# Patient Record
Sex: MALE | Race: WHITE | NOT HISPANIC OR LATINO
[De-identification: names, ages, dates, MRNs, and addresses within clinical notes are randomized per-mention and may not be internally consistent; named-entity substitution may affect disease eponyms.]

---

## 2017-01-25 ENCOUNTER — APPOINTMENT (OUTPATIENT)
Dept: ELECTROPHYSIOLOGY | Facility: CLINIC | Age: 82
End: 2017-01-25

## 2017-02-10 ENCOUNTER — OUTPATIENT (OUTPATIENT)
Dept: OUTPATIENT SERVICES | Facility: HOSPITAL | Age: 82
LOS: 1 days | Discharge: HOME | End: 2017-02-10

## 2017-02-15 ENCOUNTER — APPOINTMENT (OUTPATIENT)
Dept: RADIATION ONCOLOGY | Facility: CLINIC | Age: 82
End: 2017-02-15
Payer: MEDICARE

## 2017-02-15 VITALS — BODY MASS INDEX: 26.44 KG/M2 | WEIGHT: 189.6 LBS

## 2017-02-15 PROCEDURE — 99204 OFFICE O/P NEW MOD 45 MIN: CPT

## 2017-05-04 ENCOUNTER — APPOINTMENT (OUTPATIENT)
Dept: ELECTROPHYSIOLOGY | Facility: CLINIC | Age: 82
End: 2017-05-04

## 2017-06-14 PROCEDURE — 77263 THER RADIOLOGY TX PLNG CPLX: CPT

## 2017-06-15 ENCOUNTER — APPOINTMENT (OUTPATIENT)
Dept: RADIATION ONCOLOGY | Facility: CLINIC | Age: 82
End: 2017-06-15

## 2017-06-22 PROCEDURE — 77334 RADIATION TREATMENT AID(S): CPT | Mod: 26

## 2017-06-22 PROCEDURE — 77321 SPECIAL TELETX PORT PLAN: CPT | Mod: 26

## 2017-06-22 PROCEDURE — 77307 TELETHX ISODOSE PLAN CPLX: CPT | Mod: 26,59

## 2017-07-19 PROCEDURE — 77280 THER RAD SIMULAJ FIELD SMPL: CPT | Mod: 26

## 2017-07-20 PROCEDURE — 77427 RADIATION TX MANAGEMENT X5: CPT

## 2017-07-24 VITALS
HEART RATE: 71 BPM | RESPIRATION RATE: 16 BRPM | OXYGEN SATURATION: 98 % | DIASTOLIC BLOOD PRESSURE: 58 MMHG | SYSTOLIC BLOOD PRESSURE: 90 MMHG

## 2017-07-27 PROCEDURE — 77427 RADIATION TX MANAGEMENT X5: CPT

## 2017-07-31 VITALS
HEIGHT: 71 IN | WEIGHT: 214.29 LBS | OXYGEN SATURATION: 97 % | TEMPERATURE: 96.8 F | RESPIRATION RATE: 16 BRPM | HEART RATE: 78 BPM | SYSTOLIC BLOOD PRESSURE: 164 MMHG | DIASTOLIC BLOOD PRESSURE: 92 MMHG | BODY MASS INDEX: 30 KG/M2

## 2017-08-03 PROCEDURE — 77427 RADIATION TX MANAGEMENT X5: CPT

## 2017-08-09 ENCOUNTER — APPOINTMENT (OUTPATIENT)
Dept: ELECTROPHYSIOLOGY | Facility: CLINIC | Age: 82
End: 2017-08-09
Payer: MEDICARE

## 2017-08-10 PROCEDURE — 77427 RADIATION TX MANAGEMENT X5: CPT

## 2017-08-14 VITALS
OXYGEN SATURATION: 98 % | BODY MASS INDEX: 29.96 KG/M2 | DIASTOLIC BLOOD PRESSURE: 91 MMHG | RESPIRATION RATE: 16 BRPM | WEIGHT: 214 LBS | HEART RATE: 66 BPM | HEIGHT: 71 IN | SYSTOLIC BLOOD PRESSURE: 160 MMHG

## 2017-09-20 ENCOUNTER — APPOINTMENT (OUTPATIENT)
Dept: ELECTROPHYSIOLOGY | Facility: CLINIC | Age: 82
End: 2017-09-20
Payer: MEDICARE

## 2017-09-20 VITALS — DIASTOLIC BLOOD PRESSURE: 79 MMHG | SYSTOLIC BLOOD PRESSURE: 138 MMHG | HEART RATE: 60 BPM

## 2017-09-20 PROCEDURE — 93281 PM DEVICE PROGR EVAL MULTI: CPT

## 2017-09-20 RX ORDER — OFLOXACIN 3 MG/ML
0.3 SOLUTION/ DROPS OPHTHALMIC
Qty: 5 | Refills: 0 | Status: COMPLETED | COMMUNITY
Start: 2016-10-18

## 2017-09-20 RX ORDER — MUPIROCIN 20 MG/G
2 OINTMENT TOPICAL
Qty: 22 | Refills: 0 | Status: COMPLETED | COMMUNITY
Start: 2017-02-06

## 2017-10-09 ENCOUNTER — APPOINTMENT (OUTPATIENT)
Dept: RADIATION ONCOLOGY | Facility: CLINIC | Age: 82
End: 2017-10-09
Payer: MEDICARE

## 2017-10-09 VITALS
DIASTOLIC BLOOD PRESSURE: 99 MMHG | HEART RATE: 80 BPM | HEIGHT: 71 IN | SYSTOLIC BLOOD PRESSURE: 152 MMHG | OXYGEN SATURATION: 94 %

## 2017-10-09 DIAGNOSIS — C49.0 MALIGNANT NEOPLASM OF CONNECTIVE AND SOFT TISSUE OF HEAD, FACE AND NECK: ICD-10-CM

## 2017-10-09 PROCEDURE — 99024 POSTOP FOLLOW-UP VISIT: CPT

## 2017-11-28 ENCOUNTER — RX RENEWAL (OUTPATIENT)
Age: 82
End: 2017-11-28

## 2017-12-14 ENCOUNTER — APPOINTMENT (OUTPATIENT)
Dept: NEUROLOGY | Facility: CLINIC | Age: 82
End: 2017-12-14

## 2017-12-21 ENCOUNTER — OUTPATIENT (OUTPATIENT)
Dept: OUTPATIENT SERVICES | Facility: HOSPITAL | Age: 82
LOS: 1 days | End: 2017-12-21
Payer: MEDICARE

## 2017-12-21 ENCOUNTER — APPOINTMENT (OUTPATIENT)
Dept: CT IMAGING | Facility: IMAGING CENTER | Age: 82
End: 2017-12-21
Payer: MEDICARE

## 2017-12-21 DIAGNOSIS — Z00.8 ENCOUNTER FOR OTHER GENERAL EXAMINATION: ICD-10-CM

## 2017-12-21 PROCEDURE — 70450 CT HEAD/BRAIN W/O DYE: CPT | Mod: 26

## 2017-12-21 PROCEDURE — 70450 CT HEAD/BRAIN W/O DYE: CPT

## 2018-01-09 ENCOUNTER — APPOINTMENT (OUTPATIENT)
Dept: ELECTROPHYSIOLOGY | Facility: CLINIC | Age: 83
End: 2018-01-09

## 2018-04-18 ENCOUNTER — APPOINTMENT (OUTPATIENT)
Dept: ELECTROPHYSIOLOGY | Facility: CLINIC | Age: 83
End: 2018-04-18
Payer: MEDICARE

## 2018-04-18 PROCEDURE — 93281 PM DEVICE PROGR EVAL MULTI: CPT

## 2018-07-23 ENCOUNTER — APPOINTMENT (OUTPATIENT)
Dept: ELECTROPHYSIOLOGY | Facility: CLINIC | Age: 83
End: 2018-07-23

## 2018-10-24 ENCOUNTER — APPOINTMENT (OUTPATIENT)
Dept: ELECTROPHYSIOLOGY | Facility: CLINIC | Age: 83
End: 2018-10-24

## 2018-10-26 ENCOUNTER — APPOINTMENT (OUTPATIENT)
Dept: ELECTROPHYSIOLOGY | Facility: CLINIC | Age: 83
End: 2018-10-26
Payer: MEDICARE

## 2018-10-26 DIAGNOSIS — I50.22 CHRONIC SYSTOLIC (CONGESTIVE) HEART FAILURE: ICD-10-CM

## 2018-10-26 PROCEDURE — 93295 DEV INTERROG REMOTE 1/2/MLT: CPT

## 2018-10-26 PROCEDURE — 93296 REM INTERROG EVL PM/IDS: CPT

## 2019-02-04 ENCOUNTER — APPOINTMENT (OUTPATIENT)
Dept: ELECTROPHYSIOLOGY | Facility: CLINIC | Age: 84
End: 2019-02-04

## 2019-06-05 ENCOUNTER — APPOINTMENT (OUTPATIENT)
Dept: ELECTROPHYSIOLOGY | Facility: CLINIC | Age: 84
End: 2019-06-05
Payer: MEDICARE

## 2019-06-05 DIAGNOSIS — I44.2 ATRIOVENTRICULAR BLOCK, COMPLETE: ICD-10-CM

## 2019-06-05 PROCEDURE — 93281 PM DEVICE PROGR EVAL MULTI: CPT

## 2019-09-09 ENCOUNTER — APPOINTMENT (OUTPATIENT)
Dept: ELECTROPHYSIOLOGY | Facility: CLINIC | Age: 84
End: 2019-09-09

## 2019-09-23 ENCOUNTER — APPOINTMENT (OUTPATIENT)
Dept: ELECTROPHYSIOLOGY | Facility: CLINIC | Age: 84
End: 2019-09-23
Payer: MEDICARE

## 2019-09-23 PROCEDURE — 93296 REM INTERROG EVL PM/IDS: CPT

## 2019-09-23 PROCEDURE — 93294 REM INTERROG EVL PM/LDLS PM: CPT

## 2019-12-05 ENCOUNTER — APPOINTMENT (OUTPATIENT)
Dept: ELECTROPHYSIOLOGY | Facility: CLINIC | Age: 84
End: 2019-12-05

## 2020-01-13 ENCOUNTER — INPATIENT (INPATIENT)
Facility: HOSPITAL | Age: 85
LOS: 3 days | Discharge: NURSING, REHAB OR LONG-TERM CARE | DRG: 563 | End: 2020-01-17
Attending: ORTHOPAEDIC SURGERY | Admitting: ORTHOPAEDIC SURGERY
Payer: MEDICARE

## 2020-01-13 VITALS
DIASTOLIC BLOOD PRESSURE: 66 MMHG | RESPIRATION RATE: 16 BRPM | HEIGHT: 72 IN | WEIGHT: 220.02 LBS | HEART RATE: 80 BPM | SYSTOLIC BLOOD PRESSURE: 112 MMHG | OXYGEN SATURATION: 99 %

## 2020-01-13 DIAGNOSIS — S82.201A UNSPECIFIED FRACTURE OF SHAFT OF RIGHT TIBIA, INITIAL ENCOUNTER FOR CLOSED FRACTURE: ICD-10-CM

## 2020-01-13 DIAGNOSIS — I10 ESSENTIAL (PRIMARY) HYPERTENSION: ICD-10-CM

## 2020-01-13 DIAGNOSIS — D35.2 BENIGN NEOPLASM OF PITUITARY GLAND: ICD-10-CM

## 2020-01-13 LAB
ALBUMIN SERPL ELPH-MCNC: 3.2 G/DL — LOW (ref 3.3–5)
ALP SERPL-CCNC: 129 U/L — HIGH (ref 40–120)
ALT FLD-CCNC: 23 U/L — SIGNIFICANT CHANGE UP (ref 10–45)
ANION GAP SERPL CALC-SCNC: 9 MMOL/L — SIGNIFICANT CHANGE UP (ref 5–17)
APPEARANCE UR: ABNORMAL
APTT BLD: 26.5 SEC — LOW (ref 27.5–36.3)
AST SERPL-CCNC: 36 U/L — SIGNIFICANT CHANGE UP (ref 10–40)
BACTERIA # UR AUTO: ABNORMAL
BASOPHILS # BLD AUTO: 0.04 K/UL — SIGNIFICANT CHANGE UP (ref 0–0.2)
BASOPHILS NFR BLD AUTO: 0.5 % — SIGNIFICANT CHANGE UP (ref 0–2)
BILIRUB SERPL-MCNC: 0.6 MG/DL — SIGNIFICANT CHANGE UP (ref 0.2–1.2)
BILIRUB UR-MCNC: NEGATIVE — SIGNIFICANT CHANGE UP
BLD GP AB SCN SERPL QL: NEGATIVE — SIGNIFICANT CHANGE UP
BUN SERPL-MCNC: 42 MG/DL — HIGH (ref 7–23)
CALCIUM SERPL-MCNC: 9.4 MG/DL — SIGNIFICANT CHANGE UP (ref 8.4–10.5)
CHLORIDE SERPL-SCNC: 101 MMOL/L — SIGNIFICANT CHANGE UP (ref 96–108)
CO2 SERPL-SCNC: 27 MMOL/L — SIGNIFICANT CHANGE UP (ref 22–31)
COLOR SPEC: YELLOW — SIGNIFICANT CHANGE UP
CREAT SERPL-MCNC: 1.7 MG/DL — HIGH (ref 0.5–1.3)
DIFF PNL FLD: NEGATIVE — SIGNIFICANT CHANGE UP
EOSINOPHIL # BLD AUTO: 0.12 K/UL — SIGNIFICANT CHANGE UP (ref 0–0.5)
EOSINOPHIL NFR BLD AUTO: 1.4 % — SIGNIFICANT CHANGE UP (ref 0–6)
EPI CELLS # UR: 0 /HPF — SIGNIFICANT CHANGE UP
GLUCOSE SERPL-MCNC: 109 MG/DL — HIGH (ref 70–99)
GLUCOSE UR QL: NEGATIVE — SIGNIFICANT CHANGE UP
HCT VFR BLD CALC: 42.8 % — SIGNIFICANT CHANGE UP (ref 39–50)
HGB BLD-MCNC: 13.1 G/DL — SIGNIFICANT CHANGE UP (ref 13–17)
HYALINE CASTS # UR AUTO: 0 /LPF — SIGNIFICANT CHANGE UP (ref 0–2)
IMM GRANULOCYTES NFR BLD AUTO: 0.7 % — SIGNIFICANT CHANGE UP (ref 0–1.5)
INR BLD: 1.08 RATIO — SIGNIFICANT CHANGE UP (ref 0.88–1.16)
KETONES UR-MCNC: NEGATIVE — SIGNIFICANT CHANGE UP
LEUKOCYTE ESTERASE UR-ACNC: ABNORMAL
LYMPHOCYTES # BLD AUTO: 2.16 K/UL — SIGNIFICANT CHANGE UP (ref 1–3.3)
LYMPHOCYTES # BLD AUTO: 25 % — SIGNIFICANT CHANGE UP (ref 13–44)
MCHC RBC-ENTMCNC: 28.3 PG — SIGNIFICANT CHANGE UP (ref 27–34)
MCHC RBC-ENTMCNC: 30.6 GM/DL — LOW (ref 32–36)
MCV RBC AUTO: 92.4 FL — SIGNIFICANT CHANGE UP (ref 80–100)
MONOCYTES # BLD AUTO: 0.54 K/UL — SIGNIFICANT CHANGE UP (ref 0–0.9)
MONOCYTES NFR BLD AUTO: 6.3 % — SIGNIFICANT CHANGE UP (ref 2–14)
NEUTROPHILS # BLD AUTO: 5.72 K/UL — SIGNIFICANT CHANGE UP (ref 1.8–7.4)
NEUTROPHILS NFR BLD AUTO: 66.1 % — SIGNIFICANT CHANGE UP (ref 43–77)
NITRITE UR-MCNC: NEGATIVE — SIGNIFICANT CHANGE UP
NRBC # BLD: 0 /100 WBCS — SIGNIFICANT CHANGE UP (ref 0–0)
PH UR: 6 — SIGNIFICANT CHANGE UP (ref 5–8)
PLATELET # BLD AUTO: 168 K/UL — SIGNIFICANT CHANGE UP (ref 150–400)
POTASSIUM SERPL-MCNC: 5 MMOL/L — SIGNIFICANT CHANGE UP (ref 3.5–5.3)
POTASSIUM SERPL-SCNC: 5 MMOL/L — SIGNIFICANT CHANGE UP (ref 3.5–5.3)
PROT SERPL-MCNC: 7.3 G/DL — SIGNIFICANT CHANGE UP (ref 6–8.3)
PROT UR-MCNC: ABNORMAL
PROTHROM AB SERPL-ACNC: 12.4 SEC — SIGNIFICANT CHANGE UP (ref 10–12.9)
RBC # BLD: 4.63 M/UL — SIGNIFICANT CHANGE UP (ref 4.2–5.8)
RBC # FLD: 14.6 % — HIGH (ref 10.3–14.5)
RBC CASTS # UR COMP ASSIST: 3 /HPF — SIGNIFICANT CHANGE UP (ref 0–4)
RH IG SCN BLD-IMP: POSITIVE — SIGNIFICANT CHANGE UP
SODIUM SERPL-SCNC: 137 MMOL/L — SIGNIFICANT CHANGE UP (ref 135–145)
SP GR SPEC: 1.02 — SIGNIFICANT CHANGE UP (ref 1.01–1.02)
UROBILINOGEN FLD QL: NEGATIVE — SIGNIFICANT CHANGE UP
WBC # BLD: 8.64 K/UL — SIGNIFICANT CHANGE UP (ref 3.8–10.5)
WBC # FLD AUTO: 8.64 K/UL — SIGNIFICANT CHANGE UP (ref 3.8–10.5)
WBC UR QL: 421 /HPF — HIGH (ref 0–5)

## 2020-01-13 PROCEDURE — 99285 EMERGENCY DEPT VISIT HI MDM: CPT

## 2020-01-13 PROCEDURE — 71045 X-RAY EXAM CHEST 1 VIEW: CPT | Mod: 26

## 2020-01-13 PROCEDURE — 73700 CT LOWER EXTREMITY W/O DYE: CPT | Mod: 26,RT

## 2020-01-13 PROCEDURE — 73502 X-RAY EXAM HIP UNI 2-3 VIEWS: CPT | Mod: 26,RT

## 2020-01-13 PROCEDURE — 73562 X-RAY EXAM OF KNEE 3: CPT | Mod: 26,RT

## 2020-01-13 PROCEDURE — 93970 EXTREMITY STUDY: CPT | Mod: 26

## 2020-01-13 PROCEDURE — 73552 X-RAY EXAM OF FEMUR 2/>: CPT | Mod: 26,RT,77

## 2020-01-13 PROCEDURE — 73590 X-RAY EXAM OF LOWER LEG: CPT | Mod: 26,RT,77

## 2020-01-13 PROCEDURE — 93010 ELECTROCARDIOGRAM REPORT: CPT

## 2020-01-13 PROCEDURE — 73552 X-RAY EXAM OF FEMUR 2/>: CPT | Mod: 26,RT

## 2020-01-13 PROCEDURE — 73610 X-RAY EXAM OF ANKLE: CPT | Mod: 26,RT

## 2020-01-13 PROCEDURE — 73590 X-RAY EXAM OF LOWER LEG: CPT | Mod: 26,RT

## 2020-01-13 PROCEDURE — 76377 3D RENDER W/INTRP POSTPROCES: CPT | Mod: 26

## 2020-01-13 RX ORDER — DESMOPRESSIN ACETATE 0.1 MG/1
1 TABLET ORAL
Qty: 0 | Refills: 0 | DISCHARGE

## 2020-01-13 RX ORDER — QUETIAPINE FUMARATE 200 MG/1
25 TABLET, FILM COATED ORAL
Refills: 0 | Status: DISCONTINUED | OUTPATIENT
Start: 2020-01-13 | End: 2020-01-15

## 2020-01-13 RX ORDER — QUETIAPINE FUMARATE 200 MG/1
1 TABLET, FILM COATED ORAL
Qty: 0 | Refills: 0 | DISCHARGE

## 2020-01-13 RX ORDER — FOLIC ACID 0.8 MG
1 TABLET ORAL DAILY
Refills: 0 | Status: DISCONTINUED | OUTPATIENT
Start: 2020-01-13 | End: 2020-01-15

## 2020-01-13 RX ORDER — OXYCODONE HYDROCHLORIDE 5 MG/1
2.5 TABLET ORAL EVERY 4 HOURS
Refills: 0 | Status: DISCONTINUED | OUTPATIENT
Start: 2020-01-13 | End: 2020-01-15

## 2020-01-13 RX ORDER — ASPIRIN/ACETAMINOPHEN/CAFFEINE 250-250-65
30 TABLET ORAL
Qty: 0 | Refills: 0 | DISCHARGE

## 2020-01-13 RX ORDER — LOSARTAN POTASSIUM 100 MG/1
100 TABLET, FILM COATED ORAL DAILY
Refills: 0 | Status: DISCONTINUED | OUTPATIENT
Start: 2020-01-13 | End: 2020-01-15

## 2020-01-13 RX ORDER — LEVOTHYROXINE SODIUM 125 MCG
150 TABLET ORAL DAILY
Refills: 0 | Status: DISCONTINUED | OUTPATIENT
Start: 2020-01-13 | End: 2020-01-15

## 2020-01-13 RX ORDER — SODIUM CHLORIDE 9 MG/ML
500 INJECTION INTRAMUSCULAR; INTRAVENOUS; SUBCUTANEOUS ONCE
Refills: 0 | Status: COMPLETED | OUTPATIENT
Start: 2020-01-13 | End: 2020-01-13

## 2020-01-13 RX ORDER — METOPROLOL TARTRATE 50 MG
50 TABLET ORAL DAILY
Refills: 0 | Status: DISCONTINUED | OUTPATIENT
Start: 2020-01-13 | End: 2020-01-15

## 2020-01-13 RX ORDER — QUETIAPINE FUMARATE 200 MG/1
25 TABLET, FILM COATED ORAL ONCE
Refills: 0 | Status: DISCONTINUED | OUTPATIENT
Start: 2020-01-13 | End: 2020-01-15

## 2020-01-13 RX ORDER — HEPARIN SODIUM 5000 [USP'U]/ML
5000 INJECTION INTRAVENOUS; SUBCUTANEOUS EVERY 8 HOURS
Refills: 0 | Status: COMPLETED | OUTPATIENT
Start: 2020-01-13 | End: 2020-01-14

## 2020-01-13 RX ORDER — ACETAMINOPHEN 500 MG
975 TABLET ORAL ONCE
Refills: 0 | Status: COMPLETED | OUTPATIENT
Start: 2020-01-13 | End: 2020-01-13

## 2020-01-13 RX ORDER — SODIUM CHLORIDE 9 MG/ML
1000 INJECTION INTRAMUSCULAR; INTRAVENOUS; SUBCUTANEOUS
Refills: 0 | Status: DISCONTINUED | OUTPATIENT
Start: 2020-01-13 | End: 2020-01-15

## 2020-01-13 RX ORDER — ATORVASTATIN CALCIUM 80 MG/1
1 TABLET, FILM COATED ORAL
Qty: 0 | Refills: 0 | DISCHARGE

## 2020-01-13 RX ORDER — ATORVASTATIN CALCIUM 80 MG/1
40 TABLET, FILM COATED ORAL AT BEDTIME
Refills: 0 | Status: DISCONTINUED | OUTPATIENT
Start: 2020-01-13 | End: 2020-01-15

## 2020-01-13 RX ORDER — ACETAMINOPHEN 500 MG
975 TABLET ORAL EVERY 8 HOURS
Refills: 0 | Status: DISCONTINUED | OUTPATIENT
Start: 2020-01-13 | End: 2020-01-15

## 2020-01-13 RX ORDER — DESMOPRESSIN ACETATE 0.1 MG/1
0.1 TABLET ORAL AT BEDTIME
Refills: 0 | Status: DISCONTINUED | OUTPATIENT
Start: 2020-01-13 | End: 2020-01-15

## 2020-01-13 RX ORDER — MORPHINE SULFATE 50 MG/1
4 CAPSULE, EXTENDED RELEASE ORAL ONCE
Refills: 0 | Status: DISCONTINUED | OUTPATIENT
Start: 2020-01-13 | End: 2020-01-13

## 2020-01-13 RX ORDER — FOLIC ACID 0.8 MG
1 TABLET ORAL
Qty: 0 | Refills: 0 | DISCHARGE

## 2020-01-13 RX ORDER — OXYCODONE HYDROCHLORIDE 5 MG/1
5 TABLET ORAL EVERY 4 HOURS
Refills: 0 | Status: DISCONTINUED | OUTPATIENT
Start: 2020-01-13 | End: 2020-01-15

## 2020-01-13 RX ADMIN — Medication 975 MILLIGRAM(S): at 21:56

## 2020-01-13 RX ADMIN — HEPARIN SODIUM 5000 UNIT(S): 5000 INJECTION INTRAVENOUS; SUBCUTANEOUS at 21:27

## 2020-01-13 RX ADMIN — DESMOPRESSIN ACETATE 0.1 MILLIGRAM(S): 0.1 TABLET ORAL at 21:24

## 2020-01-13 RX ADMIN — QUETIAPINE FUMARATE 25 MILLIGRAM(S): 200 TABLET, FILM COATED ORAL at 21:24

## 2020-01-13 RX ADMIN — Medication 975 MILLIGRAM(S): at 16:16

## 2020-01-13 RX ADMIN — SODIUM CHLORIDE 500 MILLILITER(S): 9 INJECTION INTRAMUSCULAR; INTRAVENOUS; SUBCUTANEOUS at 11:52

## 2020-01-13 RX ADMIN — ATORVASTATIN CALCIUM 40 MILLIGRAM(S): 80 TABLET, FILM COATED ORAL at 21:25

## 2020-01-13 RX ADMIN — Medication 975 MILLIGRAM(S): at 21:26

## 2020-01-13 RX ADMIN — Medication 975 MILLIGRAM(S): at 11:52

## 2020-01-13 NOTE — PATIENT PROFILE ADULT - FAMILY NEEDS
Final Clostridium difficile toxin B PCR is POSITIVE.  Toxin producing Clostridium difficile target DNA sequences detected, presumed positive for Clostridium difficile toxin B.   Rx (Flagyl or Vancomycin) prescribed upon discharge from the [facility]:  Vancomycin (VANCOCIN HCL) 125 MG capsule, 1 capsule (125 mg) by mouth 4 times daily for 14 days  If positive for C diff toxin B and treated appropriately, notify patient of results.   no

## 2020-01-13 NOTE — ED PROVIDER NOTE - OBJECTIVE STATEMENT
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  Pt was walking with his walker 7 days ago when he tripped over his feet and fell on the right side.  Did not hit head no LOC.  Has not ambulated since, has only been in the bed.  Initially was complaining of right tib / fib pain and some bruising over the area that is getting better.  Brought in by daughter because she is concerned that the hip looks externally rotated.  Pt reporting only having the pain when he moves, no pain at rest.  Pt is at his baseline mental stats according to the daughter. 89 year old male very hard of hearing with HTN, hyperlipidemia, hypothyroidism, PPM 2011 for CHB, renal CA, pituitary tumor (on prednisone), hydrocephalus with PPM most recent ECHO with EF 25% coming in with right RLE pain for a week.  Pt was walking with his walker 7 days ago when he tripped over his feet and fell on the right side.  Did not hit head no LOC.  Has not ambulated since, has only been in the bed.  Initially was complaining of right tib / fib pain and some bruising over the area that is getting better.  Brought in by daughter because she is concerned that the hip looks externally rotated.  Pt reporting only having the pain when he moves, no pain at rest.  Pt is at his baseline mental stats according to the daughter.

## 2020-01-13 NOTE — H&P ADULT - NSHPLABSRESULTS_GEN_ALL_CORE
T(C): 36.6 (20 @ 15:11)  HR: 78 (20 @ 15:11)  BP: 150/74 (20 @ 15:11)  RR: 18 (20 @ 15:11)  SpO2: 98% (20 @ 15:11)  Wt(kg): --                        13.1   8.64  )-----------( 168      ( 2020 11:58 )             42.8         137  |  101  |  42<H>  ----------------------------<  109<H>  5.0   |  27  |  1.70<H>    Ca    9.4      2020 11:55    TPro  7.3  /  Alb  3.2<L>  /  TBili  0.6  /  DBili  x   /  AST  36  /  ALT  23  /  AlkPhos  129<H>      PT/INR - ( 2020 11:58 )   PT: 12.4 sec;   INR: 1.08 ratio         PTT - ( 2020 11:58 )  PTT:26.5 sec  Urinalysis Basic - ( 2020 15:18 )    Color: Yellow / Appearance: Slightly Turbid / S.024 / pH: x  Gluc: x / Ketone: Negative  / Bili: Negative / Urobili: Negative   Blood: x / Protein: Trace / Nitrite: Negative   Leuk Esterase: Large / RBC: x / WBC x   Sq Epi: x / Non Sq Epi: x / Bacteria: x

## 2020-01-13 NOTE — ED PROVIDER NOTE - BIRTH SEX
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 Found to have a  moderate to large left pleural effusion with underlying compressive atelectasis. Small right pleural effusion with underlying compressive atelectasis.  Prominent lymph nodes in the bilateral axilla mediastinum  MRI reveals T 1 acute compression fracture  11/5/18:  patient seen and examined with son and wife at bedside.  States his breathing is labored but stable.  NPO for paracentesis and VATS pleur X placement today.    11/6/18:  POD 1 VATs Left pleural effusion drainage and PleurX placement.  Jaret  #[id number] used for interview.  Wife states he is feeling weak and having some pain at surgical site.  Breathing has improved since pleural effusion drainage.    11/7/18: POD 2 pleurX placement, did not undergo IR Paracentesis yesterday.  Ethan  # [id number] used for interview.  Wife states he is feeling better.    11/8/18 POD 3 Pt seen and examined. Breathing OK. According to RN staff unable to walk at this time, being evaluated. Back brace in place for compression fx.     11/9/18 Pleurx set up to waterseal yesterday, 500cc drained, sent for analysis. Pt seen this am, continues to c/o back pain. Family looking into rehab placement.      Vital Signs:  Vital Signs Last 24 Hrs  T(C): 36.4 (11-09-18 @ 08:33), Max: 37 (11-08-18 @ 14:30)  T(F): 97.5 (11-09-18 @ 08:33), Max: 98.6 (11-08-18 @ 14:30)  HR: 87 (11-09-18 @ 09:00) (82 - 106)  BP: 94/56 (11-09-18 @ 09:00) (91/66 - 121/73)  RR: 13 (11-09-18 @ 09:00) (11 - 24)  SpO2: 98% (11-09-18 @ 09:00) (92% - 100%) on (O2)    Telemetry/Alarms:    Relevant labs, radiology and Medications reviewed                        11.8   17.94 )-----------( 102      ( 09 Nov 2018 05:34 )             37.7     11-09    132<L>  |  96  |  22  ----------------------------<  142<H>  4.7   |  30  |  1.21    Ca    8.0<L>      09 Nov 2018 05:34      PT/INR - ( 09 Nov 2018 05:34 )   PT: 20.4 sec;   INR: 1.80 ratio         PTT - ( 09 Nov 2018 05:34 )  PTT:34.5 sec  MEDICATIONS  (STANDING):  amiodarone    Tablet 100 milliGRAM(s) Oral daily  atorvastatin 10 milliGRAM(s) Oral at bedtime  buDESOnide  80 MICROgram(s)/formoterol 4.5 MICROgram(s) Inhaler 2 Puff(s) Inhalation two times a day  clotrimazole 1% Cream 1 Application(s) Topical two times a day  dextrose 5%. 1000 milliLiter(s) (50 mL/Hr) IV Continuous <Continuous>  dextrose 50% Injectable 12.5 Gram(s) IV Push once  dextrose 50% Injectable 25 Gram(s) IV Push once  dextrose 50% Injectable 25 Gram(s) IV Push once  docusate sodium 100 milliGRAM(s) Oral three times a day  furosemide    Tablet 40 milliGRAM(s) Oral daily  gabapentin 300 milliGRAM(s) Oral three times a day  heparin  Injectable 5000 Unit(s) SubCutaneous every 8 hours  insulin glargine Injectable (LANTUS) 10 Unit(s) SubCutaneous at bedtime  insulin lispro (HumaLOG) corrective regimen sliding scale   SubCutaneous three times a day before meals  insulin lispro (HumaLOG) corrective regimen sliding scale   SubCutaneous at bedtime  lactulose Syrup 10 Gram(s) Oral daily  lidocaine 1% (Preservative-free) Injectable 25 milliLiter(s) Local Injection once  metoprolol tartrate 25 milliGRAM(s) Oral two times a day  multivitamin 1 Tablet(s) Oral daily  senna 2 Tablet(s) Oral at bedtime  spironolactone 25 milliGRAM(s) Oral two times a day  tenofovir disoproxil fumarate (VIREAD) 300 milliGRAM(s) Oral daily  tiotropium 18 MICROgram(s) Capsule 1 Capsule(s) Inhalation daily  warfarin 2.5 milliGRAM(s) Oral at bedtime    MEDICATIONS  (PRN):  ALBUTerol/ipratropium for Nebulization 3 milliLiter(s) Nebulizer every 6 hours PRN Shortness of Breath and/or Wheezing  dextrose 40% Gel 15 Gram(s) Oral once PRN Blood Glucose LESS THAN 70 milliGRAM(s)/deciliter  glucagon  Injectable 1 milliGRAM(s) IntraMuscular once PRN Glucose LESS THAN 70 milligrams/deciliter  morphine  - Injectable 4 milliGRAM(s) IV Push every 3 hours PRN Severe Pain (7 - 10)  oxyCODONE    5 mG/acetaminophen 325 mG 1 Tablet(s) Oral every 4 hours PRN Mild Pain (1 - 3)  oxyCODONE    5 mG/acetaminophen 325 mG 2 Tablet(s) Oral every 4 hours PRN Moderate Pain (4 - 6)      Physical exam  Gen NAD  Neuro AAOx3  Card irreg  Pulm decreased bases  Abd soft  Ext warm    Tubes: Left pleurx to waterseal, 500cc drained in last 24 hours    I&O's Summary    08 Nov 2018 07:01  -  09 Nov 2018 07:00  --------------------------------------------------------  IN: 0 mL / OUT: 1650 mL / NET: -1650 mL        Assessment  80y Male  w/ PAST MEDICAL & SURGICAL HISTORY:  Hyperlipidemia  Hypogammaglobulinemia: received IVIG  Hepatitis B virus infection, unspecified chronicity: retrovirals  Chronic diastolic congestive heart failure  Essential hypertension  DM type 2 (diabetes mellitus, type 2)  B-cell lymphoma, unspecified B-cell lymphoma type, unspecified body region: CLL stage 4/SLL; met NHL  Atrial fibrillation, unspecified type  COPD (chronic obstructive pulmonary disease)  History of esophageal dilatation  S/P cholecystectomy  H/O prostatectomy  admitted with complaints of Patient is a 80y old  Male who presents with a chief complaint of CC: Lower back pain and shortness of breath (09 Nov 2018 09:13)  POD 4 VATS (video-assisted thoracoscopic surgery) pleurx      PLAN  will cont to drain to waterseal until ready for d/c  f/u fluid analysis  care as per pulm/medicine  CXR reviewed, cont b/l effusions    Discussed with Cardiothoracic Team at AM rounds. Male

## 2020-01-13 NOTE — ED ADULT NURSE NOTE - OBJECTIVE STATEMENT
89 y/o male c/o fall 1 week ago. Pt lives at home and has home health aide. Pt is hard of hearing. Pt fell off bed 1 week ago, says he tripped and fell onto his R side. Denies LOC or head trauma. Daughter helped pt up and did not seek medical tx, says pt denied pain at the time. Over past week, bruising developed to R shin and ankle, with external rotation noted by family. Per daughter, pt is normally continent but has not had BM in 1 week. Normally uses walker to ambulate but has not been able to bear weight on R leg since fall. PMH of HTN, high cholesterol. On aspirin. Denies N/V/D, fever, chills, cough, confusion or change in mental status, syncope, dizziness, chest pain, bleeding, SOB. A&Ox4, wheezing bilateral lower lungs, no edema, abd is nontender but distended, active bowel sounds, bruising noted to R shin and ankle, R foot is externally rotated. Pain noted upon palpation to R ankle, pt able to move all extremities. IV placed and labs drawn, will continue to reassess. 89 y/o male c/o fall 1 week ago. Pt lives at home and has home health aide. Pt is hard of hearing. Pt fell off bed 1 week ago, says he tripped and fell onto his R side. Denies LOC or head trauma. Daughter helped pt up and did not seek medical tx, says pt denied pain at the time. Over past week, bruising developed to R shin and ankle, with external rotation noted by family. Per daughter, pt is normally continent but has not had BM in 1 week. Normally uses walker to ambulate but has not been able to bear weight on R leg since fall. PMH of HTN, high cholesterol. On aspirin. Denies N/V/D, fever, chills, cough, confusion or change in mental status, syncope, dizziness, chest pain, bleeding, SOB. A&Ox4, wheezing bilateral lower lungs, no edema, abd is nontender but distended, active bowel sounds, bruising noted to R shin and ankle, R foot is externally rotated. Pain noted upon palpation to R ankle, pt able to move all extremities, +2 bilateral pedal pulses present, sensation equal bilateral lower extremities. IV placed and labs drawn, will continue to reassess.

## 2020-01-13 NOTE — H&P ADULT - HISTORY OF PRESENT ILLNESS
89yMale c/o R leg pain s/p mechanical fall 1 week ago, when the patient tripped getting up. Patient denies head hit or LOC. Patient denies numbness or tingling in the RLE. Patient denies any other injuries.      PMH:  Hydrocephalus, Adult  CA - Renal Cancer  Pituitary Macroadenoma  Hypercholesterolemia  Hypercholesterolemia  Essential Hypertension    PSH:  Status Post Transsphenoidal Pituitary Resection    AH:    Meds: See med rec

## 2020-01-13 NOTE — CONSULT NOTE ADULT - ASSESSMENT
89 yo male presents s/p fall with a right distal tib fx scheduled for Open reduction and internal fixation, hx of a pituitary tumor on prednisone. Patient denies any CP or shortness of breath. ambulates minimally

## 2020-01-13 NOTE — H&P ADULT - NSHPPHYSICALEXAM_GEN_ALL_CORE
PE  Gen: Laying in bed, alert and oriented, NAD  Resp: Unlabored breathing  RLE: Skin intact, no ecchymosis,   SILT DP/SP/ [place]/Saph,   +EHL/FHL/TA/Gastroc,   Knee/ankle painless ROM,   hip ROM limited 2/2 pain,   DP+,   soft compartments, no calf ttp,   +log roll.    Secondary:  No TTP over bony landmarks, SILT BL, ROM intact BL, distal pulses palpable. PE  Gen: Laying in bed, alert, NAD, confused  HEENT: extremely [place]  Resp: Unlabored breathing  RLE: Skin intact, ecchymotic   SILT unable to cooperate with good sensory exam  +EHL/FHL/TA/Gastroc,    DP+,   soft compartments,    Secondary:  No TTP over bony landmarks, SILT BL, ROM intact BL, distal pulses palpable.

## 2020-01-13 NOTE — ED PROVIDER NOTE - PMH
CA - Renal Cancer    Essential Hypertension    Hydrocephalus, Adult    Hypercholesterolemia    Hypercholesterolemia    Pituitary Macroadenoma

## 2020-01-13 NOTE — CONSULT NOTE ADULT - SUBJECTIVE AND OBJECTIVE BOX
89 year old male very hard of hearing with HTN, hyperlipidemia, hypothyroidism, PPM  for CHB, renal CA, pituitary tumor (on prednisone), hydrocephalus with PPM most recent ECHO with EF 25% coming in with right RLE pain for a week.  Pt was walking with his walker 7 days ago when he tripped over his feet and fell on the right side.  Did not hit head no LOC.  Has not ambulated since, has only been in the bed.  Initially was complaining of right tib / fib pain and some bruising over the area that is getting better.  Brought in by daughter because she is concerned that the hip looks externally rotated.  Pt reporting only having the pain when he moves, no pain at rest.  . Patient found to have a right  distal tib fib fx scheduled for OR 1/15          PAST MEDICAL & SURGICAL HISTORY:  Hydrocephalus, Adult  CA - Renal Cancer  Pituitary Macroadenoma  Hypercholesterolemia  Hypercholesterolemia  Essential Hypertension  Status Post Transsphenoidal Pituitary Resection        MEDICATIONS  (STANDING):  acetaminophen   Tablet .. 975 milliGRAM(s) Oral every 8 hours  atorvastatin 40 milliGRAM(s) Oral at bedtime  bisacodyl 5 milliGRAM(s) Oral once  desmopressin 0.1 milliGRAM(s) Oral at bedtime  folic acid 1 milliGRAM(s) Oral daily  heparin  Injectable 5000 Unit(s) SubCutaneous every 8 hours  levothyroxine 150 MICROGram(s) Oral daily  losartan 100 milliGRAM(s) Oral daily  metoprolol succinate ER 50 milliGRAM(s) Oral daily  predniSONE   Tablet 5 milliGRAM(s) Oral daily  QUEtiapine 25 milliGRAM(s) Oral once  QUEtiapine 25 milliGRAM(s) Oral two times a day  sodium chloride 0.9%. 1000 milliLiter(s) (125 mL/Hr) IV Continuous <Continuous>    MEDICATIONS  (PRN):  oxyCODONE    IR 2.5 milliGRAM(s) Oral every 4 hours PRN Moderate Pain (4 - 6)  oxyCODONE    IR 5 milliGRAM(s) Oral every 4 hours PRN Severe Pain (7 - 10)    Social Hx:  Tobacco: quit greater then 30 years ago  ETOH: neg  Drugs: Neg    Family Hx:  As per my conversation with the patient, non contributory      ROS  CONSTITUTIONAL: No weakness, fevers or chills  EYES/ENT: No visual changes;  No vertigo or throat pain [place]  NECK: No pain or stiffness  RESPIRATORY: No cough, wheezing, hemoptysis; No shortness of breath  CARDIOVASCULAR: No chest pain or palpitations  GASTROINTESTINAL: No abdominal or epigastric pain. No nausea, vomiting, or hematemesis; No diarrhea or constipation. No melena or hematochezia.  GENITOURINARY: No dysuria, frequency or hematuria  NEUROLOGICAL: No numbness or weakness  SKIN: No itching, burning, rashes, or lesions   MUSCULOSKELETAL: right leg pain    INTERVAL HPI/OVERNIGHT EVENTS:  T(C): 36.6 (20 @ 21:10), Max: 36.6 (20 @ 11:45)  HR: 65 (20 @ 21:10) (65 - 80)  BP: 169/75 (20 @ 21:10) (112/66 - 169/75)  RR: 18 (20 @ 21:10) (16 - 18)  SpO2: 98% (20 @ 21:10) (94% - 99%)  Wt(kg): --  I&O's Summary      PHYSICAL EXAM:  GENERAL: NAD, well-groomed, well-developed  HEAD:  Atraumatic, Normocephalic  EYES: EOMI, PERRLA, conjunctiva and sclera clear  ENMT: No tonsillar erythema, exudates, or enlargement; Moist mucous membranes, Good dentition, No lesions  NECK: Supple, No JVD, Normal thyroid  NERVOUS SYSTEM:  Alert & Oriented X3, Good concentration; Motor Strength 5/5 B/L upper and lower extremities; DTRs 2+ intact and symmetric  CHEST/LUNG: Clear to percussion bilaterally; No rales, rhonchi, wheezing, or rubs  HEART: Regular rate and rhythm; No murmurs, rubs, or gallops  ABDOMEN: Soft, Nontender, Nondistended; Bowel sounds present  EXTREMITIES:  2+ Peripheral Pulses, No clubbing, cyanosis, or edema  LYMPH: No lymphadenopathy noted  SKIN: No rashes or lesions        LABS:                        13.1   8.64  )-----------( 168      ( 2020 11:58 )             42.8         137  |  101  |  42<H>  ----------------------------<  109<H>  5.0   |  27  |  1.70<H>    Ca    9.4      2020 11:55    TPro  7.3  /  Alb  3.2<L>  /  TBili  0.6  /  DBili  x   /  AST  36  /  ALT  23  /  AlkPhos  129<H>  -13    PT/INR - ( 2020 11:58 )   PT: 12.4 sec;   INR: 1.08 ratio         PTT - ( 2020 11:58 )  PTT:26.5 sec  Urinalysis Basic - ( 2020 15:18 )    Color: Yellow / Appearance: Slightly Turbid / S.024 / pH: x  Gluc: x / Ketone: Negative  / Bili: Negative / Urobili: Negative   Blood: x / Protein: Trace / Nitrite: Negative   Leuk Esterase: Large / RBC: 3 /hpf /  /HPF   Sq Epi: x / Non Sq Epi: 0 /hpf / Bacteria: Few      CAPILLARY BLOOD GLUCOSE      EKG: pending      Urinalysis Basic - ( 2020 15:18 )    Color: Yellow / Appearance: Slightly Turbid / S.024 / pH: x  Gluc: x / Ketone: Negative  / Bili: Negative / Urobili: Negative   Blood: x / Protein: Trace / Nitrite: Negative   Leuk Esterase: Large / RBC: 3 /hpf /  /HPF   Sq Epi: x / Non Sq Epi: 0 /hpf / Bacteria: Few        Radiology reports:

## 2020-01-13 NOTE — ED PROVIDER NOTE - ATTENDING CONTRIBUTION TO CARE
****ATTENDING**** 89yo male hx listed BIB family for RLE pain. Patient states he had a fall 1 week ago and has had decreased activity and now family noticed that his leg is ER. Pt denies any HA, cp, palp, sob, abd pain. Complains of mild right leg pain.   On exam, Patient is awake,alert,oriented x 3. Patient is well appearing and in no acute distress. Patient's chest is clear to ausculation, +s1s2. Abdomen is soft nd/nt +BS. RLE + ecchymosis to lower extremity w swelling. cap refill <2 secs. + mild tender above ankle, ER.   check labs, xray to eval for fx.

## 2020-01-13 NOTE — ED PROVIDER NOTE - MUSCULOSKELETAL, MLM
Pelvis stable, non tender b/l.  LLE NT full ROM.  RLE non tnder over hip and knee.  Some tenderness over tib fib and ankle.  Right foot NT.

## 2020-01-13 NOTE — ED ADULT NURSE REASSESSMENT NOTE - NS ED NURSE REASSESS COMMENT FT1
patient changed and repositioned by aide at bedside. family states patient is "agitated and needs something to calm him down" PA aware. will continue to monitor.

## 2020-01-13 NOTE — ED ADULT NURSE REASSESSMENT NOTE - NS ED NURSE REASSESS COMMENT FT1
ortho at the bedside to splint patient leg. patient and family aware of plan of care. will continue to monitor. patient comfort and safety provided.

## 2020-01-13 NOTE — H&P ADULT - NSICDXPASTMEDICALHX_GEN_ALL_CORE_FT
PAST MEDICAL HISTORY:  CA - Renal Cancer     Essential Hypertension     Hydrocephalus, Adult     Hypercholesterolemia     Hypercholesterolemia     Pituitary Macroadenoma

## 2020-01-14 ENCOUNTER — TRANSCRIPTION ENCOUNTER (OUTPATIENT)
Age: 85
End: 2020-01-14

## 2020-01-14 LAB
24R-OH-CALCIDIOL SERPL-MCNC: 43.2 NG/ML — SIGNIFICANT CHANGE UP (ref 30–80)
ALBUMIN SERPL ELPH-MCNC: 2.8 G/DL — LOW (ref 3.3–5)
ANION GAP SERPL CALC-SCNC: 10 MMOL/L — SIGNIFICANT CHANGE UP (ref 5–17)
APTT BLD: 27.6 SEC — SIGNIFICANT CHANGE UP (ref 27.5–36.3)
BASOPHILS # BLD AUTO: 0.03 K/UL — SIGNIFICANT CHANGE UP (ref 0–0.2)
BASOPHILS NFR BLD AUTO: 0.4 % — SIGNIFICANT CHANGE UP (ref 0–2)
BLD GP AB SCN SERPL QL: NEGATIVE — SIGNIFICANT CHANGE UP
BUN SERPL-MCNC: 37 MG/DL — HIGH (ref 7–23)
CALCIUM SERPL-MCNC: 8.6 MG/DL — SIGNIFICANT CHANGE UP (ref 8.4–10.5)
CHLORIDE SERPL-SCNC: 103 MMOL/L — SIGNIFICANT CHANGE UP (ref 96–108)
CO2 SERPL-SCNC: 24 MMOL/L — SIGNIFICANT CHANGE UP (ref 22–31)
CREAT SERPL-MCNC: 1.58 MG/DL — HIGH (ref 0.5–1.3)
CULTURE RESULTS: SIGNIFICANT CHANGE UP
EOSINOPHIL # BLD AUTO: 0.06 K/UL — SIGNIFICANT CHANGE UP (ref 0–0.5)
EOSINOPHIL NFR BLD AUTO: 0.9 % — SIGNIFICANT CHANGE UP (ref 0–6)
GLUCOSE SERPL-MCNC: 100 MG/DL — HIGH (ref 70–99)
HCT VFR BLD CALC: 39 % — SIGNIFICANT CHANGE UP (ref 39–50)
HGB BLD-MCNC: 11.8 G/DL — LOW (ref 13–17)
IMM GRANULOCYTES NFR BLD AUTO: 0.7 % — SIGNIFICANT CHANGE UP (ref 0–1.5)
INR BLD: 1.07 RATIO — SIGNIFICANT CHANGE UP (ref 0.88–1.16)
LYMPHOCYTES # BLD AUTO: 1.84 K/UL — SIGNIFICANT CHANGE UP (ref 1–3.3)
LYMPHOCYTES # BLD AUTO: 26.7 % — SIGNIFICANT CHANGE UP (ref 13–44)
MCHC RBC-ENTMCNC: 27.9 PG — SIGNIFICANT CHANGE UP (ref 27–34)
MCHC RBC-ENTMCNC: 30.3 GM/DL — LOW (ref 32–36)
MCV RBC AUTO: 92.2 FL — SIGNIFICANT CHANGE UP (ref 80–100)
MONOCYTES # BLD AUTO: 0.44 K/UL — SIGNIFICANT CHANGE UP (ref 0–0.9)
MONOCYTES NFR BLD AUTO: 6.4 % — SIGNIFICANT CHANGE UP (ref 2–14)
NEUTROPHILS # BLD AUTO: 4.48 K/UL — SIGNIFICANT CHANGE UP (ref 1.8–7.4)
NEUTROPHILS NFR BLD AUTO: 64.9 % — SIGNIFICANT CHANGE UP (ref 43–77)
PLATELET # BLD AUTO: 156 K/UL — SIGNIFICANT CHANGE UP (ref 150–400)
POTASSIUM SERPL-MCNC: 4.7 MMOL/L — SIGNIFICANT CHANGE UP (ref 3.5–5.3)
POTASSIUM SERPL-SCNC: 4.7 MMOL/L — SIGNIFICANT CHANGE UP (ref 3.5–5.3)
PROTHROM AB SERPL-ACNC: 12.3 SEC — SIGNIFICANT CHANGE UP (ref 10–13.1)
RBC # BLD: 4.23 M/UL — SIGNIFICANT CHANGE UP (ref 4.2–5.8)
RBC # FLD: 14.6 % — HIGH (ref 10.3–14.5)
RH IG SCN BLD-IMP: POSITIVE — SIGNIFICANT CHANGE UP
SODIUM SERPL-SCNC: 137 MMOL/L — SIGNIFICANT CHANGE UP (ref 135–145)
SPECIMEN SOURCE: SIGNIFICANT CHANGE UP
WBC # BLD: 6.9 K/UL — SIGNIFICANT CHANGE UP (ref 3.8–10.5)
WBC # FLD AUTO: 6.9 K/UL — SIGNIFICANT CHANGE UP (ref 3.8–10.5)

## 2020-01-14 RX ORDER — CEFTRIAXONE 500 MG/1
1000 INJECTION, POWDER, FOR SOLUTION INTRAMUSCULAR; INTRAVENOUS EVERY 24 HOURS
Refills: 0 | Status: DISCONTINUED | OUTPATIENT
Start: 2020-01-14 | End: 2020-01-14

## 2020-01-14 RX ORDER — SODIUM CHLORIDE 9 MG/ML
1000 INJECTION INTRAMUSCULAR; INTRAVENOUS; SUBCUTANEOUS
Refills: 0 | Status: DISCONTINUED | OUTPATIENT
Start: 2020-01-14 | End: 2020-01-15

## 2020-01-14 RX ORDER — POLYETHYLENE GLYCOL 3350 17 G/17G
17 POWDER, FOR SOLUTION ORAL DAILY
Refills: 0 | Status: DISCONTINUED | OUTPATIENT
Start: 2020-01-14 | End: 2020-01-15

## 2020-01-14 RX ORDER — CEFTRIAXONE 500 MG/1
1000 INJECTION, POWDER, FOR SOLUTION INTRAMUSCULAR; INTRAVENOUS EVERY 24 HOURS
Refills: 0 | Status: DISCONTINUED | OUTPATIENT
Start: 2020-01-14 | End: 2020-01-15

## 2020-01-14 RX ADMIN — CEFTRIAXONE 100 MILLIGRAM(S): 500 INJECTION, POWDER, FOR SOLUTION INTRAMUSCULAR; INTRAVENOUS at 06:32

## 2020-01-14 RX ADMIN — Medication 150 MICROGRAM(S): at 06:29

## 2020-01-14 RX ADMIN — POLYETHYLENE GLYCOL 3350 17 GRAM(S): 17 POWDER, FOR SOLUTION ORAL at 13:37

## 2020-01-14 RX ADMIN — Medication 5 MILLIGRAM(S): at 12:51

## 2020-01-14 RX ADMIN — QUETIAPINE FUMARATE 25 MILLIGRAM(S): 200 TABLET, FILM COATED ORAL at 06:28

## 2020-01-14 RX ADMIN — DESMOPRESSIN ACETATE 0.1 MILLIGRAM(S): 0.1 TABLET ORAL at 21:26

## 2020-01-14 RX ADMIN — LOSARTAN POTASSIUM 100 MILLIGRAM(S): 100 TABLET, FILM COATED ORAL at 06:30

## 2020-01-14 RX ADMIN — ATORVASTATIN CALCIUM 40 MILLIGRAM(S): 80 TABLET, FILM COATED ORAL at 21:25

## 2020-01-14 RX ADMIN — Medication 1 MILLIGRAM(S): at 12:51

## 2020-01-14 RX ADMIN — Medication 50 MILLIGRAM(S): at 06:30

## 2020-01-14 RX ADMIN — Medication 975 MILLIGRAM(S): at 13:37

## 2020-01-14 RX ADMIN — Medication 975 MILLIGRAM(S): at 06:36

## 2020-01-14 RX ADMIN — Medication 975 MILLIGRAM(S): at 07:06

## 2020-01-14 RX ADMIN — QUETIAPINE FUMARATE 25 MILLIGRAM(S): 200 TABLET, FILM COATED ORAL at 18:06

## 2020-01-14 RX ADMIN — HEPARIN SODIUM 5000 UNIT(S): 5000 INJECTION INTRAVENOUS; SUBCUTANEOUS at 06:30

## 2020-01-14 RX ADMIN — Medication 5 MILLIGRAM(S): at 06:53

## 2020-01-14 RX ADMIN — Medication 975 MILLIGRAM(S): at 21:24

## 2020-01-14 RX ADMIN — Medication 975 MILLIGRAM(S): at 14:07

## 2020-01-14 RX ADMIN — HEPARIN SODIUM 5000 UNIT(S): 5000 INJECTION INTRAVENOUS; SUBCUTANEOUS at 13:37

## 2020-01-14 RX ADMIN — HEPARIN SODIUM 5000 UNIT(S): 5000 INJECTION INTRAVENOUS; SUBCUTANEOUS at 21:26

## 2020-01-14 RX ADMIN — Medication 975 MILLIGRAM(S): at 21:54

## 2020-01-14 NOTE — ADVANCED PRACTICE NURSE CONSULT - REASON FOR CONSULT
Requested by staff to assess skin status of pt a/w a sacral pressure injury. PMH is noted:  89yo Male c/o R leg pain s/p mechanical fall 1 week ago, when the patient tripped getting up. Patient denies head hit or LOC. Patient denies numbness or tingling in the RLE. Patient denies any other injuries.    PMH:  Hydrocephalus, Adult  CA - Renal Cancer  Pituitary Macroadenoma  Hypercholesterolemia  Essential Hypertension    PSH:  Status Post Transsphenoidal Pituitary Resection

## 2020-01-14 NOTE — DIETITIAN INITIAL EVALUATION ADULT. - ADD RECOMMEND
1) Continue current diet as tolerated. Encourage PO intake of protein-rich foods. 2) Continue Ensure Enlive BID to supplement PO intake, encourage healing post-op.

## 2020-01-14 NOTE — ADVANCED PRACTICE NURSE CONSULT - RECOMMEDATIONS
Will recommend the followin. Gluteal cleft:  Cavilon to periwound skin, Tuck an Aquacel rope into the wound cavity and undermined spaces, allow the buttocks to fold over and hold the dressing in place. Change daily and prn for drainage/soiling.  2. routine pericare with Renetta  3. Continue with turning and positioning  4. Nutrition support as pt condition allows  Tx plan discussed with RN

## 2020-01-14 NOTE — DIETITIAN INITIAL EVALUATION ADULT. - OTHER INFO
Pertinent Information: 89 year old male very hard of hearing with HTN, hyperlipidemia, hypothyroidism, PPM 2011 for CHB, renal CA, pituitary tumor (on prednisone), hydrocephalus with PPM most recent ECHO with EF 25% coming in with right RLE pain for a week Pertinent Information: 89 year old male very hard of hearing with HTN, hyperlipidemia, hypothyroidism, PPM 2011 for CHB, renal CA, pituitary tumor (on prednisone), hydrocephalus with PPM most recent ECHO with EF 25% coming in with right RLE pain for a week s/p mechanical fall. Found to have R distal tibia fracture, plan for OR tomorrow.     Spoke with daughter at bedside per pt preference, daughter reports pt is [place]. Daughter reports pt with good PO intake and appetite PTA, consumes ~ 3 meals per day consisting of a variety of foods. Lives alone with HHA. Confirms NKFA. Denies pt with chewing/swallowing difficulty, nausea, vomiting, diarrhea, constipation PTA. Takes folic acid.     Weights: Daughter reports pt with stable wt, unable to recall UBW but denies noticing any visual wt loss. Current dosing wt noted as 220lbs. No additional wts available.     Since admission daughter reports pt with good PO intake and appetite in-house, consuming ~ 75% fo meals. Daughter reports pt consumed oatmeal with Ensure Enlive this morning. Discussed importance of prioritizing protein with meals for recovery and healing. No acute GI distress noted, though daughter reports pt with some constipation, made aware of availability of prunes on the menu. Last BM 1/13. Patient/daughter with no nutrition-related questions at this time. Made aware RD remains available as needed.

## 2020-01-14 NOTE — ADVANCED PRACTICE NURSE CONSULT - ASSESSMENT
The pt is on [place], on a [employer] P 500 support surface being turned and positioned. Pt with right tibial fx- OR planned for 1/15.  The pt was seen by nutrition and a consult is noted.   Pts GUILLE Reyes was at the bedside. States she provides care for the pt at home- "I did not know the wound was open."  Pt denies any hx of surgery to the sacral area in the past.  Upon assessment, the pt presents with a full-thickness wound in the gluteal cleft. It measures 3cm x 0.5cm x 1.1cm with undermining of 0.5cm noted at 12 o'clock.  As per hx provided in the medical record, pt with history of a prior pressure ulcer in this area. will classify as a stage 3 pressure injury at this time. Given location (gluteal cleft) suggest that moisture may have also impacted skin integrity. The wound base is red, moist. The wound edges are macerated; the periwound skin has a wrinkled, dimpled appearance.   An Aquacel rope was gently tucked into the cavity to assist with drainage.  The pt is incontinent of urine and staff had applied Renetta barrier cream to the b/l buttocks  with pericare.

## 2020-01-14 NOTE — PROGRESS NOTE ADULT - SUBJECTIVE AND OBJECTIVE BOX
Patient is a 89y old  Male who presents with a chief complaint of Right Distal Tibia Fracture (2020 06:49)        MEDICATIONS  (STANDING):  acetaminophen   Tablet .. 975 milliGRAM(s) Oral every 8 hours  atorvastatin 40 milliGRAM(s) Oral at bedtime  cefTRIAXone   IVPB 1000 milliGRAM(s) IV Intermittent every 24 hours  desmopressin 0.1 milliGRAM(s) Oral at bedtime  folic acid 1 milliGRAM(s) Oral daily  heparin  Injectable 5000 Unit(s) SubCutaneous every 8 hours  levothyroxine 150 MICROGram(s) Oral daily  losartan 100 milliGRAM(s) Oral daily  metoprolol succinate ER 50 milliGRAM(s) Oral daily  predniSONE   Tablet 5 milliGRAM(s) Oral daily  QUEtiapine 25 milliGRAM(s) Oral once  QUEtiapine 25 milliGRAM(s) Oral two times a day  sodium chloride 0.9%. 1000 milliLiter(s) (125 mL/Hr) IV Continuous <Continuous>  sodium chloride 0.9%. 1000 milliLiter(s) (125 mL/Hr) IV Continuous <Continuous>    MEDICATIONS  (PRN):  oxyCODONE    IR 2.5 milliGRAM(s) Oral every 4 hours PRN Moderate Pain (4 - 6)  oxyCODONE    IR 5 milliGRAM(s) Oral every 4 hours PRN Severe Pain (7 - 10)  polyethylene glycol 3350 17 Gram(s) Oral daily PRN Constipation      Allergies    latex (Unknown)  No Known Drug Allergies    VITALS:   T(C): 36.4 (20 @ 09:32), Max: 36.6 (20 @ 15:11)  HR: 69 (20 @ 09:32) (65 - 78)  BP: 132/76 (20 @ 09:32) (132/76 - 169/75)  RR: 18 (20 @ 09:32) (18 - 18)  SpO2: 100% (20 @ 09:32) (94% - 100%)  Wt(kg): --    PHYSICAL EXAM:  GENERAL: NAD, well nourished and conversant  HEAD:  Atraumatic  EYES: EOM, PERRLA, conjunctiva pink and sclera white  ENT: No tonsillar erythema, exudates, or enlargement, moist mucous membranes, good dentition, no lesions  NECK: Supple, No JVD, normal thyroid, carotids with normal upstrokes and no bruits  CHEST/LUNG: Clear to auscultation bilaterally, No rales, rhonchi, wheezing, or rubs  HEART: Regular rate and rhythm, No murmurs, rubs, or gallops  ABDOMEN: Soft, nondistended, no masses, guarding, tenderness or rebound, bowel sounds present  EXTREMITIES:  2+ Peripheral Pulses, No clubbing, cyanosis, or edema. No arthritis of shoulders, elbows, hands, hips, knees, ankles, or feet. No DJD C spine, T spine, or L/S spine  LYMPH: No lymphadenopathy noted  SKIN: No rashes or lesions  NERVOUS SYSTEM:  Alert & Oriented X3, normal cognitive function. Motor Strength 5/5 right upper and right lower.  5/5 left upper and left lower extremities, DTRs 2+ intact and symmetric    LABS:        CBC Full  -  ( 2020 09:41 )  WBC Count : 6.90 K/uL  RBC Count : 4.23 M/uL  Hemoglobin : 11.8 g/dL  Hematocrit : 39.0 %  Platelet Count - Automated : 156 K/uL  Mean Cell Volume : 92.2 fl  Mean Cell Hemoglobin : 27.9 pg  Mean Cell Hemoglobin Concentration : 30.3 gm/dL  Auto Neutrophil # : 4.48 K/uL  Auto Lymphocyte # : 1.84 K/uL  Auto Monocyte # : 0.44 K/uL  Auto Eosinophil # : 0.06 K/uL  Auto Basophil # : 0.03 K/uL  Auto Neutrophil % : 64.9 %  Auto Lymphocyte % : 26.7 %  Auto Monocyte % : 6.4 %  Auto Eosinophil % : 0.9 %  Auto Basophil % : 0.4 %        137  |  103  |  37<H>  ----------------------------<  100<H>  4.7   |  24  |  1.58<H>    Ca    8.6      2020 07:25    TPro  x   /  Alb  2.8<L>  /  TBili  x   /  DBili  x   /  AST  x   /  ALT  x   /  AlkPhos  x       LIVER FUNCTIONS - ( 2020 07:25 )  Alb: 2.8 g/dL / Pro: x     / ALK PHOS: x     / ALT: x     / AST: x     / GGT: x           PT/INR - ( 2020 09:29 )   PT: 12.3 sec;   INR: 1.07 ratio         PTT - ( 2020 09:29 )  PTT:27.6 sec  Urinalysis Basic - ( 2020 15:18 )    Color: Yellow / Appearance: Slightly Turbid / S.024 / pH: x  Gluc: x / Ketone: Negative  / Bili: Negative / Urobili: Negative   Blood: x / Protein: Trace / Nitrite: Negative   Leuk Esterase: Large / RBC: 3 /hpf /  /HPF   Sq Epi: x / Non Sq Epi: 0 /hpf / Bacteria: Few      CAPILLARY BLOOD GLUCOSE          RADIOLOGY & ADDITIONAL TESTS:      Consultant(s):    Care Discussed with Consultants/Other Providers [ ] YES  [ ] NO Patient is a 89y old  Male who presents with a chief complaint of Right Distal Tibia Fracture. He is very hard of hearing with HTN, hyperlipidemia, hypothyroidism, PPM  for CHB, renal CA, pituitary tumor (on prednisone), hydrocephalus with PPM most recent ECHO with EF 25% coming in with right RLE pain for a week.  Pt was walking with his walker 7 days ago when he tripped over his feet and fell on the right side.  Did not hit head no LOC.  Has not ambulated since, has only been in the bed.  Initially was complaining of right tib / fib pain and some bruising over the area that is getting better.  Brought in by daughter because she is concerned that the hip looks externally rotated.  Pt reporting only having the pain when he moves, no pain at rest.  Patient found to have a right  distal tib fib fx scheduled for OR 1/15          MEDICATIONS  (STANDING):  acetaminophen   Tablet .. 975 milliGRAM(s) Oral every 8 hours  atorvastatin 40 milliGRAM(s) Oral at bedtime  cefTRIAXone   IVPB 1000 milliGRAM(s) IV Intermittent every 24 hours  desmopressin 0.1 milliGRAM(s) Oral at bedtime  folic acid 1 milliGRAM(s) Oral daily  heparin  Injectable 5000 Unit(s) SubCutaneous every 8 hours  levothyroxine 150 MICROGram(s) Oral daily  losartan 100 milliGRAM(s) Oral daily  metoprolol succinate ER 50 milliGRAM(s) Oral daily  predniSONE   Tablet 5 milliGRAM(s) Oral daily  QUEtiapine 25 milliGRAM(s) Oral once  QUEtiapine 25 milliGRAM(s) Oral two times a day  sodium chloride 0.9%. 1000 milliLiter(s) (125 mL/Hr) IV Continuous <Continuous>  sodium chloride 0.9%. 1000 milliLiter(s) (125 mL/Hr) IV Continuous <Continuous>    MEDICATIONS  (PRN):  oxyCODONE    IR 2.5 milliGRAM(s) Oral every 4 hours PRN Moderate Pain (4 - 6)  oxyCODONE    IR 5 milliGRAM(s) Oral every 4 hours PRN Severe Pain (7 - 10)  polyethylene glycol 3350 17 Gram(s) Oral daily PRN Constipation      Allergies    latex (Unknown)  No Known Drug Allergies    VITALS:   T(C): 36.4 (20 @ 09:32), Max: 36.6 (01-13-20 @ 15:11)  HR: 69 (20 @ 09:32) (65 - 78)  BP: 132/76 (20 @ 09:32) (132/76 - 169/75)  RR: 18 (20 @ 09:32) (18 - 18)  SpO2: 100% (20 @ 09:32) (94% - 100%)  Wt(kg): --    PHYSICAL EXAM:  GENERAL: NAD, well nourished and conversant  HEAD:  Atraumatic  EYES: EOM, PERRLA, conjunctiva pink and sclera white  ENT: No tonsillar erythema, exudates, or enlargement, moist mucous membranes, good dentition, no lesions  NECK: Supple, No JVD, normal thyroid, carotids with normal upstrokes and no bruits  CHEST/LUNG: Clear to auscultation bilaterally, No rales, rhonchi, wheezing, or rubs  HEART: Regular rate and rhythm, No murmurs, rubs, or gallops  ABDOMEN: Soft, nondistended, no masses, guarding, tenderness or rebound, bowel sounds present  EXTREMITIES:  2+ Peripheral Pulses, No clubbing, cyanosis, or edema. No arthritis of shoulders, elbows, hands, hips, knees, ankles, or feet. No DJD C spine, T spine, or L/S spine  LYMPH: No lymphadenopathy noted  SKIN: No rashes or lesions  NERVOUS SYSTEM:  Alert & Oriented X3, normal cognitive function. Motor Strength 5/5 right upper and right lower.  5/5 left upper and left lower extremities, DTRs 2+ intact and symmetric    LABS:        CBC Full  -  ( 2020 09:41 )  WBC Count : 6.90 K/uL  RBC Count : 4.23 M/uL  Hemoglobin : 11.8 g/dL  Hematocrit : 39.0 %  Platelet Count - Automated : 156 K/uL  Mean Cell Volume : 92.2 fl  Mean Cell Hemoglobin : 27.9 pg  Mean Cell Hemoglobin Concentration : 30.3 gm/dL  Auto Neutrophil # : 4.48 K/uL  Auto Lymphocyte # : 1.84 K/uL  Auto Monocyte # : 0.44 K/uL  Auto Eosinophil # : 0.06 K/uL  Auto Basophil # : 0.03 K/uL  Auto Neutrophil % : 64.9 %  Auto Lymphocyte % : 26.7 %  Auto Monocyte % : 6.4 %  Auto Eosinophil % : 0.9 %  Auto Basophil % : 0.4 %        137  |  103  |  37<H>  ----------------------------<  100<H>  4.7   |  24  |  1.58<H>    Ca    8.6      2020 07:25    TPro  x   /  Alb  2.8<L>  /  TBili  x   /  DBili  x   /  AST  x   /  ALT  x   /  AlkPhos  x       LIVER FUNCTIONS - ( 2020 07:25 )  Alb: 2.8 g/dL / Pro: x     / ALK PHOS: x     / ALT: x     / AST: x     / GGT: x           PT/INR - ( 2020 09:29 )   PT: 12.3 sec;   INR: 1.07 ratio         PTT - ( 2020 09:29 )  PTT:27.6 sec  Urinalysis Basic - ( 2020 15:18 )    Color: Yellow / Appearance: Slightly Turbid / S.024 / pH: x  Gluc: x / Ketone: Negative  / Bili: Negative / Urobili: Negative   Blood: x / Protein: Trace / Nitrite: Negative   Leuk Esterase: Large / RBC: 3 /hpf /  /HPF   Sq Epi: x / Non Sq Epi: 0 /hpf / Bacteria: Few      CAPILLARY BLOOD GLUCOSE          RADIOLOGY & ADDITIONAL TESTS:      Consultant(s):    Care Discussed with Consultants/Other Providers [ ] YES  [ ] NO

## 2020-01-14 NOTE — PROGRESS NOTE ADULT - SUBJECTIVE AND OBJECTIVE BOX
Pt S/E at bedside, no acute events overnight, pain controlled. No complaints this morning. Denies numbness/tingling. Resting comfortably.    Vital Signs Last 24 Hrs  T(C): 36.3 (14 Jan 2020 06:24), Max: 36.6 (13 Jan 2020 11:45)  T(F): 97.4 (14 Jan 2020 06:24), Max: 97.9 (13 Jan 2020 11:45)  HR: 65 (14 Jan 2020 06:24) (65 - 80)  BP: 148/86 (14 Jan 2020 06:24) (112/66 - 169/75)  BP(mean): --  RR: 18 (14 Jan 2020 06:24) (16 - 18)  SpO2: 96% (14 Jan 2020 06:24) (94% - 99%)    Gen: NAD, AAOx3    Right Lower Extremity:  + long leg splint clean/dry/intact  +EHL/FHL  SILT all 5 toes  Cap refill brisk  Warm well perfused toes  Compartments soft  No pain with passive stretch of toes

## 2020-01-14 NOTE — DIETITIAN INITIAL EVALUATION ADULT. - PHYSICAL APPEARANCE
overweight/well nourished/other (specify) Ht: 72inches , Wt: 220lbs, BMI: 29.8kg/m2, IBW: 178lbs +/- 10%, %IBW: 124%  Edema: none, Skin: free of pressure injuries per nursing flow sheets, noted with IAD

## 2020-01-15 ENCOUNTER — TRANSCRIPTION ENCOUNTER (OUTPATIENT)
Age: 85
End: 2020-01-15

## 2020-01-15 LAB
ANION GAP SERPL CALC-SCNC: 8 MMOL/L — SIGNIFICANT CHANGE UP (ref 5–17)
APTT BLD: 29 SEC — SIGNIFICANT CHANGE UP (ref 27.5–36.3)
BLD GP AB SCN SERPL QL: NEGATIVE — SIGNIFICANT CHANGE UP
BUN SERPL-MCNC: 38 MG/DL — HIGH (ref 7–23)
CALCIUM SERPL-MCNC: 8.5 MG/DL — SIGNIFICANT CHANGE UP (ref 8.4–10.5)
CHLORIDE SERPL-SCNC: 106 MMOL/L — SIGNIFICANT CHANGE UP (ref 96–108)
CO2 SERPL-SCNC: 25 MMOL/L — SIGNIFICANT CHANGE UP (ref 22–31)
CREAT SERPL-MCNC: 1.56 MG/DL — HIGH (ref 0.5–1.3)
CULTURE RESULTS: SIGNIFICANT CHANGE UP
GLUCOSE SERPL-MCNC: 101 MG/DL — HIGH (ref 70–99)
HCT VFR BLD CALC: 35.4 % — LOW (ref 39–50)
HGB BLD-MCNC: 11.3 G/DL — LOW (ref 13–17)
INR BLD: 1.08 RATIO — SIGNIFICANT CHANGE UP (ref 0.88–1.16)
MCHC RBC-ENTMCNC: 28.9 PG — SIGNIFICANT CHANGE UP (ref 27–34)
MCHC RBC-ENTMCNC: 31.9 GM/DL — LOW (ref 32–36)
MCV RBC AUTO: 90.5 FL — SIGNIFICANT CHANGE UP (ref 80–100)
NRBC # BLD: 0 /100 WBCS — SIGNIFICANT CHANGE UP (ref 0–0)
PLATELET # BLD AUTO: 155 K/UL — SIGNIFICANT CHANGE UP (ref 150–400)
POTASSIUM SERPL-MCNC: 5 MMOL/L — SIGNIFICANT CHANGE UP (ref 3.5–5.3)
POTASSIUM SERPL-SCNC: 5 MMOL/L — SIGNIFICANT CHANGE UP (ref 3.5–5.3)
PROTHROM AB SERPL-ACNC: 12.5 SEC — SIGNIFICANT CHANGE UP (ref 10–12.9)
RBC # BLD: 3.91 M/UL — LOW (ref 4.2–5.8)
RBC # FLD: 14.6 % — HIGH (ref 10.3–14.5)
RH IG SCN BLD-IMP: POSITIVE — SIGNIFICANT CHANGE UP
SODIUM SERPL-SCNC: 139 MMOL/L — SIGNIFICANT CHANGE UP (ref 135–145)
SPECIMEN SOURCE: SIGNIFICANT CHANGE UP
WBC # BLD: 7.15 K/UL — SIGNIFICANT CHANGE UP (ref 3.8–10.5)
WBC # FLD AUTO: 7.15 K/UL — SIGNIFICANT CHANGE UP (ref 3.8–10.5)

## 2020-01-15 RX ORDER — HEPARIN SODIUM 5000 [USP'U]/ML
5000 INJECTION INTRAVENOUS; SUBCUTANEOUS EVERY 8 HOURS
Refills: 0 | Status: COMPLETED | OUTPATIENT
Start: 2020-01-15 | End: 2020-01-16

## 2020-01-15 RX ORDER — PANTOPRAZOLE SODIUM 20 MG/1
40 TABLET, DELAYED RELEASE ORAL
Refills: 0 | Status: DISCONTINUED | OUTPATIENT
Start: 2020-01-15 | End: 2020-01-17

## 2020-01-15 RX ORDER — FOLIC ACID 0.8 MG
1 TABLET ORAL DAILY
Refills: 0 | Status: DISCONTINUED | OUTPATIENT
Start: 2020-01-15 | End: 2020-01-17

## 2020-01-15 RX ORDER — ONDANSETRON 8 MG/1
4 TABLET, FILM COATED ORAL EVERY 6 HOURS
Refills: 0 | Status: DISCONTINUED | OUTPATIENT
Start: 2020-01-15 | End: 2020-01-17

## 2020-01-15 RX ORDER — POLYETHYLENE GLYCOL 3350 17 G/17G
17 POWDER, FOR SOLUTION ORAL DAILY
Refills: 0 | Status: DISCONTINUED | OUTPATIENT
Start: 2020-01-15 | End: 2020-01-17

## 2020-01-15 RX ORDER — CEFTRIAXONE 500 MG/1
1000 INJECTION, POWDER, FOR SOLUTION INTRAMUSCULAR; INTRAVENOUS EVERY 24 HOURS
Refills: 0 | Status: COMPLETED | OUTPATIENT
Start: 2020-01-15 | End: 2020-01-17

## 2020-01-15 RX ORDER — LEVOTHYROXINE SODIUM 125 MCG
150 TABLET ORAL DAILY
Refills: 0 | Status: DISCONTINUED | OUTPATIENT
Start: 2020-01-15 | End: 2020-01-17

## 2020-01-15 RX ORDER — ACETAMINOPHEN 500 MG
1000 TABLET ORAL ONCE
Refills: 0 | Status: COMPLETED | OUTPATIENT
Start: 2020-01-15 | End: 2020-01-15

## 2020-01-15 RX ORDER — OXYCODONE HYDROCHLORIDE 5 MG/1
2.5 TABLET ORAL EVERY 4 HOURS
Refills: 0 | Status: DISCONTINUED | OUTPATIENT
Start: 2020-01-15 | End: 2020-01-15

## 2020-01-15 RX ORDER — ACETAMINOPHEN 500 MG
650 TABLET ORAL EVERY 6 HOURS
Refills: 0 | Status: DISCONTINUED | OUTPATIENT
Start: 2020-01-15 | End: 2020-01-17

## 2020-01-15 RX ORDER — ASCORBIC ACID 60 MG
500 TABLET,CHEWABLE ORAL
Refills: 0 | Status: DISCONTINUED | OUTPATIENT
Start: 2020-01-15 | End: 2020-01-17

## 2020-01-15 RX ORDER — TRAMADOL HYDROCHLORIDE 50 MG/1
25 TABLET ORAL EVERY 6 HOURS
Refills: 0 | Status: DISCONTINUED | OUTPATIENT
Start: 2020-01-15 | End: 2020-01-17

## 2020-01-15 RX ORDER — LOSARTAN POTASSIUM 100 MG/1
100 TABLET, FILM COATED ORAL DAILY
Refills: 0 | Status: DISCONTINUED | OUTPATIENT
Start: 2020-01-15 | End: 2020-01-17

## 2020-01-15 RX ORDER — DESMOPRESSIN ACETATE 0.1 MG/1
0.1 TABLET ORAL AT BEDTIME
Refills: 0 | Status: DISCONTINUED | OUTPATIENT
Start: 2020-01-15 | End: 2020-01-17

## 2020-01-15 RX ORDER — METOPROLOL TARTRATE 50 MG
50 TABLET ORAL DAILY
Refills: 0 | Status: DISCONTINUED | OUTPATIENT
Start: 2020-01-15 | End: 2020-01-17

## 2020-01-15 RX ORDER — HYDROMORPHONE HYDROCHLORIDE 2 MG/ML
0.25 INJECTION INTRAMUSCULAR; INTRAVENOUS; SUBCUTANEOUS
Refills: 0 | Status: DISCONTINUED | OUTPATIENT
Start: 2020-01-15 | End: 2020-01-15

## 2020-01-15 RX ORDER — OXYCODONE HYDROCHLORIDE 5 MG/1
5 TABLET ORAL EVERY 4 HOURS
Refills: 0 | Status: DISCONTINUED | OUTPATIENT
Start: 2020-01-15 | End: 2020-01-15

## 2020-01-15 RX ORDER — ONDANSETRON 8 MG/1
4 TABLET, FILM COATED ORAL ONCE
Refills: 0 | Status: DISCONTINUED | OUTPATIENT
Start: 2020-01-15 | End: 2020-01-15

## 2020-01-15 RX ORDER — SODIUM CHLORIDE 9 MG/ML
1000 INJECTION, SOLUTION INTRAVENOUS
Refills: 0 | Status: DISCONTINUED | OUTPATIENT
Start: 2020-01-15 | End: 2020-01-17

## 2020-01-15 RX ORDER — OXYCODONE HYDROCHLORIDE 5 MG/1
2.5 TABLET ORAL EVERY 6 HOURS
Refills: 0 | Status: DISCONTINUED | OUTPATIENT
Start: 2020-01-15 | End: 2020-01-17

## 2020-01-15 RX ORDER — SENNA PLUS 8.6 MG/1
2 TABLET ORAL AT BEDTIME
Refills: 0 | Status: DISCONTINUED | OUTPATIENT
Start: 2020-01-15 | End: 2020-01-17

## 2020-01-15 RX ORDER — ATORVASTATIN CALCIUM 80 MG/1
40 TABLET, FILM COATED ORAL AT BEDTIME
Refills: 0 | Status: DISCONTINUED | OUTPATIENT
Start: 2020-01-15 | End: 2020-01-17

## 2020-01-15 RX ADMIN — Medication 975 MILLIGRAM(S): at 05:49

## 2020-01-15 RX ADMIN — Medication 150 MICROGRAM(S): at 05:21

## 2020-01-15 RX ADMIN — DESMOPRESSIN ACETATE 0.1 MILLIGRAM(S): 0.1 TABLET ORAL at 21:32

## 2020-01-15 RX ADMIN — Medication 400 MILLIGRAM(S): at 21:32

## 2020-01-15 RX ADMIN — QUETIAPINE FUMARATE 25 MILLIGRAM(S): 200 TABLET, FILM COATED ORAL at 05:21

## 2020-01-15 RX ADMIN — Medication 1000 MILLIGRAM(S): at 15:07

## 2020-01-15 RX ADMIN — CEFTRIAXONE 100 MILLIGRAM(S): 500 INJECTION, POWDER, FOR SOLUTION INTRAMUSCULAR; INTRAVENOUS at 05:23

## 2020-01-15 RX ADMIN — HEPARIN SODIUM 5000 UNIT(S): 5000 INJECTION INTRAVENOUS; SUBCUTANEOUS at 14:57

## 2020-01-15 RX ADMIN — ATORVASTATIN CALCIUM 40 MILLIGRAM(S): 80 TABLET, FILM COATED ORAL at 21:32

## 2020-01-15 RX ADMIN — Medication 1 TABLET(S): at 14:49

## 2020-01-15 RX ADMIN — HEPARIN SODIUM 5000 UNIT(S): 5000 INJECTION INTRAVENOUS; SUBCUTANEOUS at 21:34

## 2020-01-15 RX ADMIN — Medication 1000 MILLIGRAM(S): at 22:02

## 2020-01-15 RX ADMIN — Medication 5 MILLIGRAM(S): at 05:22

## 2020-01-15 RX ADMIN — Medication 975 MILLIGRAM(S): at 05:19

## 2020-01-15 RX ADMIN — Medication 500 MILLIGRAM(S): at 17:39

## 2020-01-15 RX ADMIN — Medication 400 MILLIGRAM(S): at 14:52

## 2020-01-15 RX ADMIN — POLYETHYLENE GLYCOL 3350 17 GRAM(S): 17 POWDER, FOR SOLUTION ORAL at 14:50

## 2020-01-15 RX ADMIN — Medication 1 MILLIGRAM(S): at 14:49

## 2020-01-15 RX ADMIN — Medication 50 MILLIGRAM(S): at 05:19

## 2020-01-15 RX ADMIN — LOSARTAN POTASSIUM 100 MILLIGRAM(S): 100 TABLET, FILM COATED ORAL at 05:22

## 2020-01-15 NOTE — OCCUPATIONAL THERAPY INITIAL EVALUATION ADULT - GENERAL OBSERVATIONS, REHAB EVAL
Pt received semisupine in bed, in care of private aide at bedside, A+Ox3 (time with choices), bilateral hard of hearing without hearing aids, RLE NWB in short cast, +IVL

## 2020-01-15 NOTE — OCCUPATIONAL THERAPY INITIAL EVALUATION ADULT - PERTINENT HX OF CURRENT PROBLEM, REHAB EVAL
Comminuted intra-articular right distal tibial fracture with no significant intra-articular step-off. Right distal fibular shaft fracture.

## 2020-01-15 NOTE — CHART NOTE - NSCHARTNOTEFT_GEN_A_CORE
Pt in good spirits  Aide @ bedside   No Chest Pain, SOB, N/V.  Pt is [place]    T(C): 36.3 (01-15-20 @ 13:09), Max: 36.6 (01-14-20 @ 17:00)  HR: 61 (01-15-20 @ 13:09) (60 - 69)  BP: 115/63 (01-15-20 @ 13:09) (115/63 - 171/81)  RR: 18 (01-15-20 @ 13:09) (16 - 18)  SpO2: 100% (01-15-20 @ 13:09) (97% - 100%)  Wt(kg): --    Exam:  Alert, oriented , though very [place]   No Acute Distress  Pulm: CTAB  Abdomen soft / benign  EXT   RLE       SLC in place       Calves soft       (+) moving all digits        No gross sensory deficits noted        cap refill 4-5 secs        A/P: S/p Closed reduction with cast r distal tibia    -PT: STRICT NWB  RLE  -Chk AM Labs  -DVT PPx: heparin 5000 s/q Q 8hrs  -Pain Control PO/IV Pain Rx  -Continue Current Tx--low dose narcs  -Dispo planning: TBD      ***See Above  Shon TEJADA  Orthopedics  B: [phone]  S: [phone]

## 2020-01-15 NOTE — PROGRESS NOTE ADULT - SUBJECTIVE AND OBJECTIVE BOX
Ortho Progress Note    S: Patient seen and examined. No acute events overnight. Pain well controlled with current regimen. Denies lightheadedness/dizziness, CP/SOB.       O:  Physical Exam:  Gen: Laying in bed, NAD, alert and oriented.   Resp: Unlabored breathing  Ext: EHL/FHL intact          + SILT grossly intact          +cap refill <2 sec, extremity WWP    Vital Signs Last 24 Hrs  T(C): 36.4 (15 Ac 2020 04:48), Max: 36.6 (14 Jan 2020 17:00)  T(F): 97.6 (15 Ac 2020 04:48), Max: 97.8 (14 Jan 2020 17:00)  HR: 61 (15 Ac 2020 04:48) (61 - 69)  BP: 147/80 (15 Ac 2020 04:48) (120/69 - 157/84)  BP(mean): --  RR: 18 (15 Ac 2020 04:48) (18 - 18)  SpO2: 97% (15 Ac 2020 04:48) (97% - 100%)                          11.3   7.15  )-----------( 155      ( 15 Ac 2020 04:28 )             35.4                         11.8   6.90  )-----------( 156      ( 14 Jan 2020 09:41 )             39.0       01-15    139  |  106  |  38<H>  ----------------------------<  101<H>  5.0   |  25  |  1.56<H>        PT/INR - ( 15 Ac 2020 04:28 )   PT: 12.5 sec;   INR: 1.08 ratio         PTT - ( 15 Ac 2020 04:28 )  PTT:29.0 sec

## 2020-01-15 NOTE — BRIEF OPERATIVE NOTE - OPERATION/FINDINGS
closed reduction of distal tibia/fibula fracture under anesthesia with application of short leg cast

## 2020-01-15 NOTE — OCCUPATIONAL THERAPY INITIAL EVALUATION ADULT - BALANCE TRAINING, PT EVAL
Goal: Patient will increase static/dynamic standing balance by 1 grade to facilitate increased safety, ability to perform ADLs and functional mobility within 2 weeks.

## 2020-01-15 NOTE — OCCUPATIONAL THERAPY INITIAL EVALUATION ADULT - ADDITIONAL COMMENTS
CT R tib/fib 1/13/2020: Comminuted intra-articular right distal tibial fracture with no significant intra-articular step-off. Right distal fibular shaft fracture.

## 2020-01-15 NOTE — PRE-ANESTHESIA EVALUATION ADULT - NSANTHOSAYNRD_GEN_A_CORE
No. MARY JO screening performed.  STOP BANG Legend: 0-2 = LOW Risk; 3-4 = INTERMEDIATE Risk; 5-8 = HIGH Risk

## 2020-01-15 NOTE — PROGRESS NOTE ADULT - SUBJECTIVE AND OBJECTIVE BOX
Patient is a 89y old  Male who presents with a chief complaint of Right Distal Tibia Fracture. 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  Pt was walking with his walker 7 days ago when he tripped over his feet and fell on the right side.  Did not hit head no LOC.  Has not ambulated since, has only been in the bed.  Initially was complaining of right tib / fib pain and some bruising over the area that is getting better.  Brought in by daughter because she is concerned that the hip looks externally rotated.  Pt reporting only having the pain when he moves, no pain at rest.  Patient found to have a right  distal tib fib fx . Patient seen s/p closed reduction of ankle        MEDICATIONS  (STANDING):  ascorbic acid 500 milliGRAM(s) Oral two times a day  atorvastatin 40 milliGRAM(s) Oral at bedtime  cefTRIAXone   IVPB 1000 milliGRAM(s) IV Intermittent every 24 hours  desmopressin 0.1 milliGRAM(s) Oral at bedtime  folic acid 1 milliGRAM(s) Oral daily  heparin  Injectable 5000 Unit(s) SubCutaneous every 8 hours  lactated ringers. 1000 milliLiter(s) (75 mL/Hr) IV Continuous <Continuous>  levothyroxine 150 MICROGram(s) Oral daily  losartan 100 milliGRAM(s) Oral daily  metoprolol succinate ER 50 milliGRAM(s) Oral daily  multivitamin 1 Tablet(s) Oral daily  pantoprazole    Tablet 40 milliGRAM(s) Oral before breakfast  polyethylene glycol 3350 17 Gram(s) Oral daily  predniSONE   Tablet 5 milliGRAM(s) Oral daily    MEDICATIONS  (PRN):  acetaminophen   Tablet .. 650 milliGRAM(s) Oral every 6 hours PRN Temp greater or equal to 38C (100.4F), Mild Pain (1 - 3)  aluminum hydroxide/magnesium hydroxide/simethicone Suspension 30 milliLiter(s) Oral four times a day PRN Indigestion  ondansetron Injectable 4 milliGRAM(s) IV Push every 6 hours PRN Nausea and/or Vomiting  oxyCODONE    IR 2.5 milliGRAM(s) Oral every 6 hours PRN Severe Pain (7 - 10)  senna 2 Tablet(s) Oral at bedtime PRN Constipation  traMADol 25 milliGRAM(s) Oral every 6 hours PRN Moderate Pain (4 - 6)          VITALS:   T(C): 36.7 (01-15-20 @ 21:29), Max: 36.7 (01-15-20 @ 21:29)  HR: 64 (01-15-20 @ 21:29) (60 - 68)  BP: 147/74 (01-15-20 @ 21:29) (115/63 - 171/81)  RR: 18 (01-15-20 @ 21:29) (16 - 18)  SpO2: 99% (01-15-20 @ 21:29) (97% - 100%)  Wt(kg): --    PHYSICAL EXAM:  GENERAL: NAD, well nourished and conversant  HEAD:  Atraumatic  EYES: EOM, PERRLA, conjunctiva pink and sclera white  ENT: No tonsillar erythema, exudates, or enlargement, moist mucous membranes, good dentition, no lesions  NECK: Supple, No JVD, normal thyroid, carotids with normal upstrokes and no bruits  CHEST/LUNG: Clear to auscultation bilaterally, No rales, rhonchi, wheezing, or rubs  HEART: Regular rate and rhythm, No murmurs, rubs, or gallops  ABDOMEN: Soft, nondistended, no masses, guarding, tenderness or rebound, bowel sounds present  EXTREMITIES:  2+ Peripheral Pulses, No clubbing, cyanosis, or edema. No arthritis of shoulders, elbows, hands, hips, knees, ankles, or feet. No DJD C spine, T spine, or L/S spine  LYMPH: No lymphadenopathy noted  SKIN: No rashes or lesions  NERVOUS SYSTEM:  Alert & Oriented X3, normal cognitive function. Motor Strength 5/5 right upper and right lower.  5/5 left upper and left lower extremities, DTRs 2+ intact and symmetric    LABS:        CBC Full  -  ( 15 Ac 2020 04:28 )  WBC Count : 7.15 K/uL  RBC Count : 3.91 M/uL  Hemoglobin : 11.3 g/dL  Hematocrit : 35.4 %  Platelet Count - Automated : 155 K/uL  Mean Cell Volume : 90.5 fl  Mean Cell Hemoglobin : 28.9 pg  Mean Cell Hemoglobin Concentration : 31.9 gm/dL  Auto Neutrophil # : x  Auto Lymphocyte # : x  Auto Monocyte # : x  Auto Eosinophil # : x  Auto Basophil # : x  Auto Neutrophil % : x  Auto Lymphocyte % : x  Auto Monocyte % : x  Auto Eosinophil % : x  Auto Basophil % : x    01-15    139  |  106  |  38<H>  ----------------------------<  101<H>  5.0   |  25  |  1.56<H>    Ca    8.5      15 Ac 2020 04:28    TPro  x   /  Alb  2.8<L>  /  TBili  x   /  DBili  x   /  AST  x   /  ALT  x   /  AlkPhos  x   01-14    LIVER FUNCTIONS - ( 14 Jan 2020 07:25 )  Alb: 2.8 g/dL / Pro: x     / ALK PHOS: x     / ALT: x     / AST: x     / GGT: x           PT/INR - ( 15 Ac 2020 04:28 )   PT: 12.5 sec;   INR: 1.08 ratio         PTT - ( 15 Ac 2020 04:28 )  PTT:29.0 sec    CAPILLARY BLOOD GLUCOSE          RADIOLOGY & ADDITIONAL TESTS:

## 2020-01-15 NOTE — OCCUPATIONAL THERAPY INITIAL EVALUATION ADULT - TRANSFER TRAINING, PT EVAL
Goal: Pt will perform sit to stand, bed <> chair, toilet, tub and shower transfers independently within 4 weeks

## 2020-01-15 NOTE — OCCUPATIONAL THERAPY INITIAL EVALUATION ADULT - DIAGNOSIS, OT EVAL
Pt currently presents with decreased endurance, balance, AROM, flexibility, command follow and strength limiting independence with ADLs and functional mobility.

## 2020-01-15 NOTE — PRE-ANESTHESIA EVALUATION ADULT - NSANTHPMHFT_GEN_ALL_CORE
chart and med note reviewed. h/o low ef 25%. no signs active cad/chf. pt walks with walker at home, no cp/sob. ppm

## 2020-01-15 NOTE — OCCUPATIONAL THERAPY INITIAL EVALUATION ADULT - LIVES WITH, PROFILE
AS per pt and aide, lives alone on 3rd floor of elevator [facility], no steps to enter lobby. +tub shower without shower chair or grab bars/alone

## 2020-01-16 ENCOUNTER — TRANSCRIPTION ENCOUNTER (OUTPATIENT)
Age: 85
End: 2020-01-16

## 2020-01-16 LAB
ANION GAP SERPL CALC-SCNC: 14 MMOL/L — SIGNIFICANT CHANGE UP (ref 5–17)
BUN SERPL-MCNC: 37 MG/DL — HIGH (ref 7–23)
CALCIUM SERPL-MCNC: 8.5 MG/DL — SIGNIFICANT CHANGE UP (ref 8.4–10.5)
CHLORIDE SERPL-SCNC: 102 MMOL/L — SIGNIFICANT CHANGE UP (ref 96–108)
CO2 SERPL-SCNC: 24 MMOL/L — SIGNIFICANT CHANGE UP (ref 22–31)
CREAT SERPL-MCNC: 1.67 MG/DL — HIGH (ref 0.5–1.3)
GLUCOSE SERPL-MCNC: 103 MG/DL — HIGH (ref 70–99)
HCT VFR BLD CALC: 36.9 % — LOW (ref 39–50)
HGB BLD-MCNC: 11.8 G/DL — LOW (ref 13–17)
MCHC RBC-ENTMCNC: 29.1 PG — SIGNIFICANT CHANGE UP (ref 27–34)
MCHC RBC-ENTMCNC: 32 GM/DL — SIGNIFICANT CHANGE UP (ref 32–36)
MCV RBC AUTO: 90.9 FL — SIGNIFICANT CHANGE UP (ref 80–100)
PLATELET # BLD AUTO: 162 K/UL — SIGNIFICANT CHANGE UP (ref 150–400)
POTASSIUM SERPL-MCNC: 4.8 MMOL/L — SIGNIFICANT CHANGE UP (ref 3.5–5.3)
POTASSIUM SERPL-SCNC: 4.8 MMOL/L — SIGNIFICANT CHANGE UP (ref 3.5–5.3)
RBC # BLD: 4.06 M/UL — LOW (ref 4.2–5.8)
RBC # FLD: 14.7 % — HIGH (ref 10.3–14.5)
SODIUM SERPL-SCNC: 140 MMOL/L — SIGNIFICANT CHANGE UP (ref 135–145)
WBC # BLD: 6.56 K/UL — SIGNIFICANT CHANGE UP (ref 3.8–10.5)
WBC # FLD AUTO: 6.56 K/UL — SIGNIFICANT CHANGE UP (ref 3.8–10.5)

## 2020-01-16 RX ORDER — TRAMADOL HYDROCHLORIDE 50 MG/1
0.5 TABLET ORAL
Qty: 0 | Refills: 0 | DISCHARGE
Start: 2020-01-16

## 2020-01-16 RX ORDER — ACETAMINOPHEN 500 MG
2 TABLET ORAL
Qty: 0 | Refills: 0 | DISCHARGE
Start: 2020-01-16

## 2020-01-16 RX ORDER — ASCORBIC ACID 60 MG
1 TABLET,CHEWABLE ORAL
Qty: 0 | Refills: 0 | DISCHARGE
Start: 2020-01-16

## 2020-01-16 RX ORDER — POLYETHYLENE GLYCOL 3350 17 G/17G
17 POWDER, FOR SOLUTION ORAL
Qty: 0 | Refills: 0 | DISCHARGE
Start: 2020-01-16

## 2020-01-16 RX ORDER — OXYCODONE HYDROCHLORIDE 5 MG/1
2.5 TABLET ORAL
Qty: 0 | Refills: 0 | DISCHARGE
Start: 2020-01-16

## 2020-01-16 RX ORDER — ENOXAPARIN SODIUM 100 MG/ML
40 INJECTION SUBCUTANEOUS DAILY
Refills: 0 | Status: DISCONTINUED | OUTPATIENT
Start: 2020-01-17 | End: 2020-01-17

## 2020-01-16 RX ORDER — ENOXAPARIN SODIUM 100 MG/ML
30 INJECTION SUBCUTANEOUS
Qty: 0 | Refills: 0 | DISCHARGE
Start: 2020-01-16

## 2020-01-16 RX ORDER — SENNA PLUS 8.6 MG/1
2 TABLET ORAL
Qty: 0 | Refills: 0 | DISCHARGE
Start: 2020-01-16

## 2020-01-16 RX ADMIN — DESMOPRESSIN ACETATE 0.1 MILLIGRAM(S): 0.1 TABLET ORAL at 21:15

## 2020-01-16 RX ADMIN — Medication 1 TABLET(S): at 11:07

## 2020-01-16 RX ADMIN — Medication 500 MILLIGRAM(S): at 05:19

## 2020-01-16 RX ADMIN — Medication 50 MILLIGRAM(S): at 05:19

## 2020-01-16 RX ADMIN — Medication 500 MILLIGRAM(S): at 17:44

## 2020-01-16 RX ADMIN — Medication 1 MILLIGRAM(S): at 11:07

## 2020-01-16 RX ADMIN — TRAMADOL HYDROCHLORIDE 25 MILLIGRAM(S): 50 TABLET ORAL at 11:37

## 2020-01-16 RX ADMIN — ATORVASTATIN CALCIUM 40 MILLIGRAM(S): 80 TABLET, FILM COATED ORAL at 21:15

## 2020-01-16 RX ADMIN — CEFTRIAXONE 100 MILLIGRAM(S): 500 INJECTION, POWDER, FOR SOLUTION INTRAMUSCULAR; INTRAVENOUS at 05:19

## 2020-01-16 RX ADMIN — HEPARIN SODIUM 5000 UNIT(S): 5000 INJECTION INTRAVENOUS; SUBCUTANEOUS at 21:15

## 2020-01-16 RX ADMIN — TRAMADOL HYDROCHLORIDE 25 MILLIGRAM(S): 50 TABLET ORAL at 17:48

## 2020-01-16 RX ADMIN — HEPARIN SODIUM 5000 UNIT(S): 5000 INJECTION INTRAVENOUS; SUBCUTANEOUS at 05:19

## 2020-01-16 RX ADMIN — Medication 150 MICROGRAM(S): at 05:19

## 2020-01-16 RX ADMIN — TRAMADOL HYDROCHLORIDE 25 MILLIGRAM(S): 50 TABLET ORAL at 11:07

## 2020-01-16 RX ADMIN — LOSARTAN POTASSIUM 100 MILLIGRAM(S): 100 TABLET, FILM COATED ORAL at 05:19

## 2020-01-16 RX ADMIN — Medication 5 MILLIGRAM(S): at 05:19

## 2020-01-16 RX ADMIN — HEPARIN SODIUM 5000 UNIT(S): 5000 INJECTION INTRAVENOUS; SUBCUTANEOUS at 15:44

## 2020-01-16 RX ADMIN — POLYETHYLENE GLYCOL 3350 17 GRAM(S): 17 POWDER, FOR SOLUTION ORAL at 15:43

## 2020-01-16 RX ADMIN — PANTOPRAZOLE SODIUM 40 MILLIGRAM(S): 20 TABLET, DELAYED RELEASE ORAL at 05:19

## 2020-01-16 RX ADMIN — TRAMADOL HYDROCHLORIDE 25 MILLIGRAM(S): 50 TABLET ORAL at 18:28

## 2020-01-16 NOTE — PHYSICAL THERAPY INITIAL EVALUATION ADULT - PERTINENT HX OF CURRENT PROBLEM, REHAB EVAL
89y/o M c/o R leg pain s/p mechanical fall 1 week ago, when the patient tripped getting up. Patient denies head hit or LOC. Patient denies numbness or tingling in the RLE. Patient denies any other injuries.

## 2020-01-16 NOTE — DISCHARGE NOTE PROVIDER - HOSPITAL COURSE
89yMale c/o R leg pain s/p mechanical fall 1 week ago, when the patient tripped getting up. Patient denies head hit or LOC. Patient denies numbness or tingling in the RLE. Patient denies any other injuries.        He was placed in a long leg splint in the ER and admitted to the orthopaedic service for operative management. He was seen by internal medicine, who cleared him for surgery.         He underwent a closed reduction hospital day 3, tolerated the procedure well, and was placed in a SLC. He was transferred from the PACU to the floor in stable condition.         He was seen by PT on the floor who recommended rehab placement.         He was stable for discharge POD1, hospital day 4.

## 2020-01-16 NOTE — DISCHARGE NOTE PROVIDER - NSDCCPCAREPLAN_GEN_ALL_CORE_FT
PRINCIPAL DISCHARGE DIAGNOSIS  Diagnosis: Tibia/fibula fracture, right, closed, initial encounter  Assessment and Plan of Treatment:

## 2020-01-16 NOTE — PHYSICAL THERAPY INITIAL EVALUATION ADULT - TRANSFER SAFETY CONCERNS NOTED: BED/CHAIR, REHAB EVAL
inability to maintain weight-bearing restrictions w/o assist/losing balance/decreased weight-shifting ability

## 2020-01-16 NOTE — DISCHARGE NOTE PROVIDER - NSDCFUADDINST_GEN_ALL_CORE_FT
It is important to ice and elevate your leg to keep swelling down and the pain manageable. Keep the ice on for 20 minutes, and then keep off for 20 minutes. Repeat while awake.    Cast precautions:  Keep cast dry  Do not stick anything into the cast  Monitor for signs of pressure build up from swelling: pain not controlled with Tylenol/motrin, severe pain when moves the fingers/toes, numbness/tingling    ACTIVITY: No WEIGHT BARING ON THE RIGHT LEG. Otherwise, you may return to your usual level of physical activity. If you are taking narcotic pain medication (such as Percocet) DO NOT drive a car, operate machinery or make important decisions.  DIET: Return to your usual diet.  NOTIFY YOUR SURGEON IF: You have any bleeding that does not stop, any pus draining from your wound(s), any fever (over 100.4 F) or chills, persistent nausea/vomiting, persistent diarrhea, or if your pain is not controlled on your discharge pain medications.

## 2020-01-16 NOTE — PHYSICAL THERAPY INITIAL EVALUATION ADULT - BALANCE DISTURBANCE, SYSTEM IMPAIRMENT CONTRIBUTE, REHAB EVAL
SLP Discharge Summary    Pt was discharged to SNF for continued therapy services on 5/19/18  At time of discharge, pt was able to meet 3/6 LTG's  Pt was able to demonstrate ability to tolerate regular diet/thin liquids w/o increased oropharyngeal or aspiration sxs  Pt would require supervision w/ meals to encourage PO intake at times due to decreased initiation to task  No further f/u warranted at this time for dysphagia tx  As for pt's cognitive linguistic skills, slow progress made, where she consistently remained mod-max A for comprehension, executive functions and memory  Eduction given to pt's family members (multiple children) in regards to fluctuations w/ skills at this time  Pt would continue to benefit from SLP services to maximize cognitive linguistic skills at subacute level of care at this time 
musculoskeletal

## 2020-01-16 NOTE — DISCHARGE NOTE PROVIDER - CARE PROVIDER_API CALL
[place])  Orthopaedic Surgery  [address] [place] [address], [address]  [place] [address]  Phone: [phone]  Fax: [phone]  Follow Up Time: 2 weeks

## 2020-01-16 NOTE — PHYSICAL THERAPY INITIAL EVALUATION ADULT - ACTIVE RANGE OF MOTION EXAMINATION, REHAB EVAL
bilateral upper extremity Active ROM was WFL (within functional limits)/RLE limited to placement of RLE cast/Left LE Active ROM was WFL (within functional limits)

## 2020-01-16 NOTE — PROGRESS NOTE ADULT - SUBJECTIVE AND OBJECTIVE BOX
Ortho Progress Note    S: Patient seen and examined. No acute events overnight. Pain well controlled with current regimen. Denies lightheadedness/dizziness, CP/SOB. Tolerating diet.       O:  Physical Exam:  Gen: Laying in bed, NAD, alert   Resp: Unlabored breathing  Ext: EHL/FHL/TA intact          + SILT grossly intact          +cap refill<2 sec, extremity WWP  cast in place    Vital Signs Last 24 Hrs  T(C): 36.4 (16 Jan 2020 04:37), Max: 36.7 (15 Ac 2020 21:29)  T(F): 97.5 (16 Jan 2020 04:37), Max: 98 (15 Ac 2020 21:29)  HR: 64 (16 Jan 2020 10:41) (62 - 65)  BP: 131/72 (16 Jan 2020 10:41) (130/77 - 165/74)  BP(mean): --  RR: 18 (16 Jan 2020 04:37) (18 - 18)  SpO2: 96% (16 Jan 2020 10:41) (96% - 99%)                          11.8   6.56  )-----------( 162      ( 16 Jan 2020 09:01 )             36.9                         11.3   7.15  )-----------( 155      ( 15 Ac 2020 04:28 )             35.4       01-16    140  |  102  |  37<H>  ----------------------------<  103<H>  4.8   |  24  |  1.67<H>        PT/INR - ( 15 Ac 2020 04:28 )   PT: 12.5 sec;   INR: 1.08 ratio         PTT - ( 15 Ac 2020 04:28 )  PTT:29.0 sec

## 2020-01-16 NOTE — PROGRESS NOTE ADULT - SUBJECTIVE AND OBJECTIVE BOX
Patient is a 89y old  Male who presents with a chief complaint of Right Distal Tibia Fracture. 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  Pt was walking with his walker 7 days ago when he tripped over his feet and fell on the right side.  Did not hit head no LOC.  Has not ambulated since, has only been in the bed.  Initially was complaining of right tib / fib pain and some bruising over the area that is getting better.  Brought in by daughter because she is concerned that the hip looks externally rotated.  Pt reporting only having the pain when he moves, no pain at rest.  Patient found to have a right  distal tib fib fx . Patient seen s/p closed reduction of ankle. resting comfortably today      MEDICATIONS  (STANDING):  ascorbic acid 500 milliGRAM(s) Oral two times a day  atorvastatin 40 milliGRAM(s) Oral at bedtime  cefTRIAXone   IVPB 1000 milliGRAM(s) IV Intermittent every 24 hours  desmopressin 0.1 milliGRAM(s) Oral at bedtime  folic acid 1 milliGRAM(s) Oral daily  lactated ringers. 1000 milliLiter(s) (75 mL/Hr) IV Continuous <Continuous>  levothyroxine 150 MICROGram(s) Oral daily  losartan 100 milliGRAM(s) Oral daily  metoprolol succinate ER 50 milliGRAM(s) Oral daily  multivitamin 1 Tablet(s) Oral daily  pantoprazole    Tablet 40 milliGRAM(s) Oral before breakfast  polyethylene glycol 3350 17 Gram(s) Oral daily  predniSONE   Tablet 5 milliGRAM(s) Oral daily    MEDICATIONS  (PRN):  acetaminophen   Tablet .. 650 milliGRAM(s) Oral every 6 hours PRN Temp greater or equal to 38C (100.4F), Mild Pain (1 - 3)  aluminum hydroxide/magnesium hydroxide/simethicone Suspension 30 milliLiter(s) Oral four times a day PRN Indigestion  ondansetron Injectable 4 milliGRAM(s) IV Push every 6 hours PRN Nausea and/or Vomiting  oxyCODONE    IR 2.5 milliGRAM(s) Oral every 6 hours PRN Severe Pain (7 - 10)  senna 2 Tablet(s) Oral at bedtime PRN Constipation  traMADol 25 milliGRAM(s) Oral every 6 hours PRN Moderate Pain (4 - 6)          VITALS:   T(C): 36.8 (01-16-20 @ 22:23), Max: 36.9 (01-16-20 @ 14:22)  HR: 64 (01-16-20 @ 22:23) (63 - 65)  BP: 149/77 (01-16-20 @ 22:23) (131/72 - 167/79)  RR: 18 (01-16-20 @ 22:23) (18 - 18)  SpO2: 95% (01-16-20 @ 22:23) (95% - 97%)  Wt(kg): --    PHYSICAL EXAM:  GENERAL: NAD, well nourished and conversant  HEAD:  Atraumatic  EYES: EOM, PERRLA, conjunctiva pink and sclera white  ENT: No tonsillar erythema, exudates, or enlargement, moist mucous membranes, good dentition, no lesions  NECK: Supple, No JVD, normal thyroid, carotids with normal upstrokes and no bruits  CHEST/LUNG: Clear to auscultation bilaterally, No rales, rhonchi, wheezing, or rubs  HEART: Regular rate and rhythm, No murmurs, rubs, or gallops  ABDOMEN: Soft, nondistended, no masses, guarding, tenderness or rebound, bowel sounds present  EXTREMITIES:  2+ Peripheral Pulses, No clubbing, cyanosis, or edema. No arthritis of shoulders, elbows, hands, hips, knees, ankles, or feet. No DJD C spine, T spine, or L/S spine  LYMPH: No lymphadenopathy noted  SKIN: No rashes or lesions  NERVOUS SYSTEM:  Alert & Oriented X3, normal cognitive function. Motor Strength 5/5 right upper and right lower.  5/5 left upper and left lower extremities, DTRs 2+ intact and symmetric    LABS:        CBC Full  -  ( 16 Jan 2020 09:01 )  WBC Count : 6.56 K/uL  RBC Count : 4.06 M/uL  Hemoglobin : 11.8 g/dL  Hematocrit : 36.9 %  Platelet Count - Automated : 162 K/uL  Mean Cell Volume : 90.9 fl  Mean Cell Hemoglobin : 29.1 pg  Mean Cell Hemoglobin Concentration : 32.0 gm/dL  Auto Neutrophil # : x  Auto Lymphocyte # : x  Auto Monocyte # : x  Auto Eosinophil # : x  Auto Basophil # : x  Auto Neutrophil % : x  Auto Lymphocyte % : x  Auto Monocyte % : x  Auto Eosinophil % : x  Auto Basophil % : x    01-16    140  |  102  |  37<H>  ----------------------------<  103<H>  4.8   |  24  |  1.67<H>    Ca    8.5      16 Jan 2020 07:08        PT/INR - ( 15 Ac 2020 04:28 )   PT: 12.5 sec;   INR: 1.08 ratio         PTT - ( 15 Ac 2020 04:28 )  PTT:29.0 sec    CAPILLARY BLOOD GLUCOSE          RADIOLOGY & ADDITIONAL TESTS:

## 2020-01-16 NOTE — DISCHARGE NOTE PROVIDER - NSDCMRMEDTOKEN_GEN_ALL_CORE_FT
acetaminophen 325 mg oral tablet: 2 tab(s) orally every 6 hours, As needed, Temp greater or equal to 38C (100.4F), Mild Pain (1 - 3)  ascorbic acid 500 mg oral tablet: 1 tab(s) orally 2 times a day  Aspir 81 oral delayed release tablet: 1 tab(s) orally once a day  desmopressin 0.1 mg oral tablet: 1 tab(s) orally once a day (at bedtime)  Dulcolax Laxative 5 mg oral delayed release tablet: 1 tab(s) orally once a day  enoxaparin: 30 milligram(s) subcutaneous once a day  folic acid 0.8 mg oral tablet: 1 tab(s) orally once a day  Lipitor 40 mg oral tablet: 1 tab(s) orally once a day (at bedtime)  losartan 100 mg oral tablet: 1 tab(s) orally once a day  metoprolol succinate 50 mg oral tablet, extended release: 1 tab(s) orally once a day  Multiple Vitamins oral tablet: 1 tab(s) orally once a day  oxyCODONE: 2.5 milligram(s) orally every 6 hours, As Needed for severe pain  polyethylene glycol 3350 oral powder for reconstitution: 17 gram(s) orally once a day  predniSONE 5 mg oral delayed release tablet: 1 tab(s) orally once a day  QUEtiapine 25 mg oral tablet: 1 tab(s) orally 2 times a day  senna oral tablet: 2 tab(s) orally once a day (at bedtime), As needed, Constipation  Synthroid 150 mcg (0.15 mg) oral tablet: 1 tab(s) orally once a day  traMADol 50 mg oral tablet: 0.5 tab(s) orally every 6 hours, As needed, Moderate Pain (4 - 6)  Tylenol PM 1000 mg-50 mg/30 mL oral liquid: 30 milliliter(s) orally once a day (at bedtime)

## 2020-01-16 NOTE — PHYSICAL THERAPY INITIAL EVALUATION ADULT - PRECAUTIONS/LIMITATIONS, REHAB EVAL
CTRTib/Fib: Comminuted intra-articular right distal tibial fracture with no significant intra-articular step-off. Right distal fibular shaft fracture. LEDuplex: Chronic venous changes, synechia, the residue of prior DVT affect the right and left common femoral, femoral and popliteal veins.No acute DVT identified. XRayRFemur/Hip:(-)CXR (-) CTRTib/Fib: Comminuted intra-articular right distal tibial fracture with no significant intra-articular step-off. Right distal fibular shaft fracture. LEDuplex: Chronic venous changes, synechia, the residue of prior DVT affect the right and left common femoral, femoral and popliteal veins.No acute DVT identified. XRayRFemur/Hip:(-)CXR (-)/surgical precautions/fall precautions

## 2020-01-16 NOTE — DISCHARGE NOTE PROVIDER - NSDCFUSCHEDAPPT_GEN_ALL_CORE_FT
SCOTT MURILLO ; 03/30/2020 ; [facility] [id number] SCOTT MURILLO ; 03/30/2020 ; [facility] [id number]

## 2020-01-16 NOTE — PHYSICAL THERAPY INITIAL EVALUATION ADULT - ADDITIONAL COMMENTS
lives alone on 3rd floor of elevator bl, no steps to enter lobby. +tub shower without shower chair or grab bars lives alone on 3rd floor of elevator bldg, no steps to enter lobby. +tub shower without shower chair or grab bars. ambulates independently with RW

## 2020-01-17 ENCOUNTER — TRANSCRIPTION ENCOUNTER (OUTPATIENT)
Age: 85
End: 2020-01-17

## 2020-01-17 VITALS — OXYGEN SATURATION: 92 % | SYSTOLIC BLOOD PRESSURE: 152 MMHG | HEART RATE: 77 BPM | DIASTOLIC BLOOD PRESSURE: 54 MMHG

## 2020-01-17 DIAGNOSIS — E78.00 PURE HYPERCHOLESTEROLEMIA, UNSPECIFIED: ICD-10-CM

## 2020-01-17 PROCEDURE — 36415 COLL VENOUS BLD VENIPUNCTURE: CPT

## 2020-01-17 PROCEDURE — 86900 BLOOD TYPING SEROLOGIC ABO: CPT

## 2020-01-17 PROCEDURE — 97110 THERAPEUTIC EXERCISES: CPT

## 2020-01-17 PROCEDURE — 80048 BASIC METABOLIC PNL TOTAL CA: CPT

## 2020-01-17 PROCEDURE — 86850 RBC ANTIBODY SCREEN: CPT

## 2020-01-17 PROCEDURE — 73502 X-RAY EXAM HIP UNI 2-3 VIEWS: CPT

## 2020-01-17 PROCEDURE — 73562 X-RAY EXAM OF KNEE 3: CPT

## 2020-01-17 PROCEDURE — 81001 URINALYSIS AUTO W/SCOPE: CPT

## 2020-01-17 PROCEDURE — 76377 3D RENDER W/INTRP POSTPROCES: CPT

## 2020-01-17 PROCEDURE — 97162 PT EVAL MOD COMPLEX 30 MIN: CPT

## 2020-01-17 PROCEDURE — 83690 ASSAY OF LIPASE: CPT

## 2020-01-17 PROCEDURE — 86901 BLOOD TYPING SEROLOGIC RH(D): CPT

## 2020-01-17 PROCEDURE — 76000 FLUOROSCOPY <1 HR PHYS/QHP: CPT

## 2020-01-17 PROCEDURE — 85027 COMPLETE CBC AUTOMATED: CPT

## 2020-01-17 PROCEDURE — 99285 EMERGENCY DEPT VISIT HI MDM: CPT

## 2020-01-17 PROCEDURE — 73590 X-RAY EXAM OF LOWER LEG: CPT

## 2020-01-17 PROCEDURE — 85610 PROTHROMBIN TIME: CPT

## 2020-01-17 PROCEDURE — 85730 THROMBOPLASTIN TIME PARTIAL: CPT

## 2020-01-17 PROCEDURE — 73700 CT LOWER EXTREMITY W/O DYE: CPT

## 2020-01-17 PROCEDURE — 93970 EXTREMITY STUDY: CPT

## 2020-01-17 PROCEDURE — 71045 X-RAY EXAM CHEST 1 VIEW: CPT

## 2020-01-17 PROCEDURE — 73552 X-RAY EXAM OF FEMUR 2/>: CPT

## 2020-01-17 PROCEDURE — 87086 URINE CULTURE/COLONY COUNT: CPT

## 2020-01-17 PROCEDURE — 73610 X-RAY EXAM OF ANKLE: CPT

## 2020-01-17 PROCEDURE — 97530 THERAPEUTIC ACTIVITIES: CPT

## 2020-01-17 PROCEDURE — 97166 OT EVAL MOD COMPLEX 45 MIN: CPT

## 2020-01-17 PROCEDURE — 80053 COMPREHEN METABOLIC PANEL: CPT

## 2020-01-17 PROCEDURE — 82306 VITAMIN D 25 HYDROXY: CPT

## 2020-01-17 PROCEDURE — 93005 ELECTROCARDIOGRAM TRACING: CPT

## 2020-01-17 PROCEDURE — 82040 ASSAY OF SERUM ALBUMIN: CPT

## 2020-01-17 RX ADMIN — PANTOPRAZOLE SODIUM 40 MILLIGRAM(S): 20 TABLET, DELAYED RELEASE ORAL at 05:21

## 2020-01-17 RX ADMIN — LOSARTAN POTASSIUM 100 MILLIGRAM(S): 100 TABLET, FILM COATED ORAL at 05:20

## 2020-01-17 RX ADMIN — Medication 1 TABLET(S): at 12:20

## 2020-01-17 RX ADMIN — ENOXAPARIN SODIUM 40 MILLIGRAM(S): 100 INJECTION SUBCUTANEOUS at 12:20

## 2020-01-17 RX ADMIN — CEFTRIAXONE 100 MILLIGRAM(S): 500 INJECTION, POWDER, FOR SOLUTION INTRAMUSCULAR; INTRAVENOUS at 05:20

## 2020-01-17 RX ADMIN — Medication 500 MILLIGRAM(S): at 05:20

## 2020-01-17 RX ADMIN — Medication 1 MILLIGRAM(S): at 12:20

## 2020-01-17 RX ADMIN — Medication 50 MILLIGRAM(S): at 05:20

## 2020-01-17 RX ADMIN — Medication 150 MICROGRAM(S): at 05:20

## 2020-01-17 RX ADMIN — Medication 5 MILLIGRAM(S): at 05:20

## 2020-01-17 RX ADMIN — POLYETHYLENE GLYCOL 3350 17 GRAM(S): 17 POWDER, FOR SOLUTION ORAL at 12:20

## 2020-01-17 NOTE — PROGRESS NOTE ADULT - ASSESSMENT
89M sp R Tibia CR, SLC    Neuro: Pain control  Resp: IS  GI: Regular diet, bowel reg  MSK: NWB, PT/OT  Heme: DVT PPX  Dispo: rehab
89M sp R Tibia CR, SLC    Neuro: Pain control  Resp: IS  GI: Regular diet, bowel reg  MSK: NWB, PT/OT  Heme: DVT PPX  Dispo: rehab
89M with R distal tibia fracture  Analgesia  DVT ppx held after midnight tonight for OR tomorrow  NWB RLE in long leg splint  NPO after midnight tonight  IVF while NPO  Medical optimization for OR tomorrow  Plan for OR tomorrow 1/15 for ex fix vs orif RLE
89M with R tibial shaft fracture    ·	pain control  ·	IS  ·	NPO,IVF  ·	NWB RLE  ·	DVT PPx on hold for or  ·	appreciate med clearance    [id number]
Patient found to have a right  distal tib fib fx seen s/p closed reduction. Pain meds as needed . Encourage PO. OOB to chair. physical therapy as tolerated. Patient ambulates minimally at home. Eventual DC planning to rehab vs home with home physical therapy
Patient found to have a right  distal tib fib fx seen s/p closed reduction. Pain meds as needed . Encourage PO. OOB to chair. physical therapy as tolerated. Patient ambulates minimally at home. Eventual DC planning to rehab vs home with home physical therapy
Patient found to have a right  distal tib fib fx seen s/p closed reduction. Pain meds as needed . follow swelling of LE. Cast may need to be bivalved. physical therapy as tolerated. Patient ambulates minimally at home. Eventual DC planning to rehab vs home with home physical therapy
Patient found to have a right  distal tib fib fx scheduled for OR 1/15

## 2020-01-17 NOTE — PROGRESS NOTE ADULT - SUBJECTIVE AND OBJECTIVE BOX
Pt seen/examined. Doing well. Pain controlled. No acute overnight complaints or events. Tolerating diet.     T(C): 36.8 (01-17-20 @ 05:18), Max: 36.9 (01-16-20 @ 14:22)  HR: 61 (01-17-20 @ 05:18) (61 - 65)  BP: 156/77 (01-17-20 @ 05:18) (131/72 - 167/79)  RR: 18 (01-17-20 @ 05:18) (18 - 18)  SpO2: 95% (01-17-20 @ 05:18) (95% - 97%)  Wt(kg): --    Gen: awake, alert, NAD, laying in bed  Resp: no increased work of breathing  RLE:  +EHL/FHL/TA/GS  SILT L3-S1  WWP extremity  Compartments soft  In short leg cast                             11.8   6.56  )-----------( 162      ( 16 Jan 2020 09:01 )             36.9       01-16    140  |  102  |  37<H>  ----------------------------<  103<H>  4.8   |  24  |  1.67<H>

## 2020-01-17 NOTE — PROGRESS NOTE ADULT - NSHPATTENDINGPLANDISCUSS_GEN_ALL_CORE
the patient and the orthopedic resident

## 2020-01-17 NOTE — PROGRESS NOTE ADULT - REASON FOR ADMISSION
Right Distal Tibia Fracture

## 2020-01-17 NOTE — PROGRESS NOTE ADULT - SUBJECTIVE AND OBJECTIVE BOX
Patient is a 89y old  Male who presents with a chief complaint of Right Distal Tibia Fracture. 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  Pt was walking with his walker 7 days ago when he tripped over his feet and fell on the right side.  Did not hit head no LOC.  Has not ambulated since, has only been in the bed.  Initially was complaining of right tib / fib pain and some bruising over the area that is getting better.  Brought in by daughter because she is concerned that the hip looks externally rotated.  Pt reporting only having the pain when he moves, no pain at rest.  Patient found to have a right  distal tib fib fx . Patient seen s/p closed reduction of ankle. resting comfortably today. Patient medically ready for discharge.      MEDICATIONS  (STANDING):  ascorbic acid 500 milliGRAM(s) Oral two times a day  atorvastatin 40 milliGRAM(s) Oral at bedtime  cefTRIAXone   IVPB 1000 milliGRAM(s) IV Intermittent every 24 hours  desmopressin 0.1 milliGRAM(s) Oral at bedtime  folic acid 1 milliGRAM(s) Oral daily  lactated ringers. 1000 milliLiter(s) (75 mL/Hr) IV Continuous <Continuous>  levothyroxine 150 MICROGram(s) Oral daily  losartan 100 milliGRAM(s) Oral daily  metoprolol succinate ER 50 milliGRAM(s) Oral daily  multivitamin 1 Tablet(s) Oral daily  pantoprazole    Tablet 40 milliGRAM(s) Oral before breakfast  polyethylene glycol 3350 17 Gram(s) Oral daily  predniSONE   Tablet 5 milliGRAM(s) Oral daily    MEDICATIONS  (PRN):  acetaminophen   Tablet .. 650 milliGRAM(s) Oral every 6 hours PRN Temp greater or equal to 38C (100.4F), Mild Pain (1 - 3)  aluminum hydroxide/magnesium hydroxide/simethicone Suspension 30 milliLiter(s) Oral four times a day PRN Indigestion  ondansetron Injectable 4 milliGRAM(s) IV Push every 6 hours PRN Nausea and/or Vomiting  oxyCODONE    IR 2.5 milliGRAM(s) Oral every 6 hours PRN Severe Pain (7 - 10)  senna 2 Tablet(s) Oral at bedtime PRN Constipation  traMADol 25 milliGRAM(s) Oral every 6 hours PRN Moderate Pain (4 - 6)          Vital Signs Last 24 Hrs  T(C): 36.5 (17 Jan 2020 08:02), Max: 36.8 (16 Jan 2020 22:23)  T(F): 97.7 (17 Jan 2020 08:02), Max: 98.3 (17 Jan 2020 00:47)  HR: 77 (17 Jan 2020 12:20) (60 - 77)  BP: 152/54 (17 Jan 2020 12:20) (129/79 - 156/77)  BP(mean): --  RR: 18 (17 Jan 2020 08:02) (18 - 18)  SpO2: 92% (17 Jan 2020 12:20) (92% - 97%)    PHYSICAL EXAM:  GENERAL: NAD, well nourished and conversant  HEAD:  Atraumatic  EYES: EOM, PERRLA, conjunctiva pink and sclera white  ENT: No tonsillar erythema, exudates, or enlargement, moist mucous membranes, good dentition, no lesions  NECK: Supple, No JVD, normal thyroid, carotids with normal upstrokes and no bruits  CHEST/LUNG: Clear to auscultation bilaterally, No rales, rhonchi, wheezing, or rubs  HEART: Regular rate and rhythm, No murmurs, rubs, or gallops  ABDOMEN: Soft, nondistended, no masses, guarding, tenderness or rebound, bowel sounds present  EXTREMITIES:  2+ Peripheral Pulses, No clubbing, cyanosis, or edema.  sp ORIF tibial fracture   LYMPH: No lymphadenopathy noted  SKIN: No rashes or lesions  NERVOUS SYSTEM:  Alert & Oriented X3, normal cognitive function. Motor Strength 5/5 right upper and right lower.  5/5 left upper and left lower extremities, DTRs 2+ intact and symmetric    LAB:    CBC Full  -  ( 16 Jan 2020 09:01 )  WBC Count : 6.56 K/uL  RBC Count : 4.06 M/uL  Hemoglobin : 11.8 g/dL  Hematocrit : 36.9 %  Platelet Count - Automated : 162 K/uL  Mean Cell Volume : 90.9 fl  Mean Cell Hemoglobin : 29.1 pg  Mean Cell Hemoglobin Concentration : 32.0 gm/dL  Auto Neutrophil # : x  Auto Lymphocyte # : x  Auto Monocyte # : x  Auto Eosinophil # : x  Auto Basophil # : x  Auto Neutrophil % : x  Auto Lymphocyte % : x  Auto Monocyte % : x  Auto Eosinophil % : x  Auto Basophil % : x    01-16    140  |  102  |  37<H>  ----------------------------<  103<H>  4.8   |  24  |  1.67<H>    Ca    8.5      16 Jan 2020 07:08        PT/INR - ( 15 Ac 2020 04:28 )   PT: 12.5 sec;   INR: 1.08 ratio         PTT - ( 15 Ac 2020 04:28 )  PTT:29.0 sec    CAPILLARY BLOOD GLUCOSE          RADIOLOGY & ADDITIONAL TESTS:

## 2020-01-17 NOTE — DISCHARGE NOTE NURSING/CASE MANAGEMENT/SOCIAL WORK - PATIENT PORTAL LINK FT
You can access the FollowMyHealth Patient Portal offered by [facility] by registering at the following website: [email or web address]. By joining [facility]’s FollowMyHealth portal, you will also be able to view your health information using other applications (apps) compatible with our system.

## 2020-03-30 ENCOUNTER — APPOINTMENT (OUTPATIENT)
Dept: ELECTROPHYSIOLOGY | Facility: CLINIC | Age: 85
End: 2020-03-30

## 2021-12-13 NOTE — DIETITIAN INITIAL EVALUATION ADULT. - PERTINENT MEDS FT
Chronic and stable condition   States elevation at the UC and ER as well as the office  States home BP is in the high 120's   Currently controlled with Benazepril, carvedilol, nifedipine   Toan active with work now and has lost 10 lbs since september  Continue with medication regimen   Rocephin, Lipitor, Doculax, folic acid, synthroid, prednisone

## 2021-12-17 NOTE — OCCUPATIONAL THERAPY INITIAL EVALUATION ADULT - ADAPTIVE EQUIPMENT NEEDED
Biopsy Type: H and E Hide Anticipated Plan (Based On Presumed Biopsy Results)?: No Size Of Lesion In Cm: 0 Validate Note Data (See Information Below): Yes Path Notes (To The Dermatopathologist): Check margins Curettage Text: The wound bed was treated with curettage after the biopsy was performed. Electrodesiccation And Curettage Text: The wound bed was treated with electrodesiccation and curettage after the biopsy was performed. Electrodesiccation Text: The wound bed was treated with electrodesiccation after the biopsy was performed. Information: Selecting Yes will display possible errors in your note based on the variables you have selected. This validation is only offered as a suggestion for you. PLEASE NOTE THAT THE VALIDATION TEXT WILL BE REMOVED WHEN YOU FINALIZE YOUR NOTE. IF YOU WANT TO FAX A PRELIMINARY NOTE YOU WILL NEED TO TOGGLE THIS TO 'NO' IF YOU DO NOT WANT IT IN YOUR FAXED NOTE. no Dressing: Band-Aid Silver Nitrate Text: The wound bed was treated with silver nitrate after the biopsy was performed. Type Of Destruction Used: Curettage Anesthesia Type: 1% lidocaine with epinephrine Billing Type: Third-Party Bill Biopsy Method: double edge Personna blade Depth Of Biopsy: dermis Consent: Verbal consent was obtained and risks were reviewed including but not limited to scarring, infection, bleeding, scabbing, incomplete removal, nerve damage and allergy to anesthesia. Anesthesia Volume In Cc (Will Not Render If 0): 1 Wound Care: Aquaphor Hemostasis: Electrocautery and Aluminum Chloride Notification Instructions: Patient will be notified of biopsy results. However, patient instructed to call the office if not contacted within 2 weeks. Detail Level: Detailed Post-Care Instructions: I reviewed with the patient in detail post-care instructions. Patient is to keep the biopsy site dry overnight, and then apply aquaphor once daily until healed. Patient may apply hydrogen peroxide soaks to remove any crusting. Cryotherapy Text: The wound bed was treated with cryotherapy after the biopsy was performed.

## 2022-09-08 NOTE — DISCHARGE NOTE PROVIDER - NSDCHOSPICE_GEN_A_CORE
September 8, 2022     Patient: Gasper Little   YOB: 2009   Date of Visit: 9/8/2022       To Whom it May Concern:    Gasper Little was seen in my clinic on 9/8/2022 at 9:40 am.     Please excuse Gasper Calderon for his absence from school on the date listed above to be able to make his appointment. He may return to school without restrictions on 9/9/22    Sincerely,         Rui Aguilera MD    Medical information is confidential and cannot be disclosed without the written consent of the patient or his representative.       No

## 2025-03-24 NOTE — OCCUPATIONAL THERAPY INITIAL EVALUATION ADULT - RANGE OF MOTION EXAMINATION, LOWER EXTREMITY
see written instruction bilateral LE Active ROM was WFL  (within functional limits)/hip/knee flexion tested, ankle NT